# Patient Record
Sex: FEMALE | Race: WHITE | NOT HISPANIC OR LATINO
[De-identification: names, ages, dates, MRNs, and addresses within clinical notes are randomized per-mention and may not be internally consistent; named-entity substitution may affect disease eponyms.]

---

## 2003-09-25 PROBLEM — H40.1130 PRIMARY OPEN ANGLE GLAUCOMA (POAG): Noted: 2021-06-09

## 2003-09-25 PROBLEM — H26.491 POSTERIOR CAPSULAR OPACITY: Noted: 2021-06-09

## 2003-09-25 PROBLEM — H04.123 DRY EYE SYNDROME: Noted: 2021-06-09

## 2003-09-25 PROBLEM — H35.341 MACULAR HOLE: Noted: 2021-06-09

## 2003-09-25 PROBLEM — H43.393 VITREOUS FLOATERS: Noted: 2021-06-09

## 2017-01-11 ENCOUNTER — APPOINTMENT (OUTPATIENT)
Dept: CARDIOLOGY | Facility: CLINIC | Age: 82
End: 2017-01-11

## 2017-02-14 ENCOUNTER — APPOINTMENT (OUTPATIENT)
Dept: CARDIOLOGY | Facility: CLINIC | Age: 82
End: 2017-02-14

## 2017-03-22 ENCOUNTER — APPOINTMENT (OUTPATIENT)
Dept: CARDIOLOGY | Facility: CLINIC | Age: 82
End: 2017-03-22

## 2017-03-22 VITALS
HEIGHT: 63 IN | SYSTOLIC BLOOD PRESSURE: 124 MMHG | BODY MASS INDEX: 24.98 KG/M2 | DIASTOLIC BLOOD PRESSURE: 70 MMHG | WEIGHT: 141 LBS | HEART RATE: 54 BPM

## 2017-09-06 ENCOUNTER — APPOINTMENT (OUTPATIENT)
Dept: CARDIOLOGY | Facility: CLINIC | Age: 82
End: 2017-09-06

## 2017-09-06 VITALS
SYSTOLIC BLOOD PRESSURE: 120 MMHG | BODY MASS INDEX: 24.63 KG/M2 | WEIGHT: 139 LBS | HEART RATE: 68 BPM | DIASTOLIC BLOOD PRESSURE: 68 MMHG | HEIGHT: 63 IN

## 2018-01-30 ENCOUNTER — APPOINTMENT (OUTPATIENT)
Dept: CARDIOLOGY | Facility: CLINIC | Age: 83
End: 2018-01-30

## 2018-01-30 VITALS
SYSTOLIC BLOOD PRESSURE: 124 MMHG | WEIGHT: 132 LBS | DIASTOLIC BLOOD PRESSURE: 78 MMHG | HEIGHT: 63 IN | HEART RATE: 58 BPM | BODY MASS INDEX: 23.39 KG/M2

## 2018-05-29 ENCOUNTER — APPOINTMENT (OUTPATIENT)
Dept: CARDIOLOGY | Facility: CLINIC | Age: 83
End: 2018-05-29

## 2018-05-29 ENCOUNTER — NON-APPOINTMENT (OUTPATIENT)
Age: 83
End: 2018-05-29

## 2018-05-29 VITALS
BODY MASS INDEX: 23.57 KG/M2 | DIASTOLIC BLOOD PRESSURE: 70 MMHG | SYSTOLIC BLOOD PRESSURE: 126 MMHG | WEIGHT: 133 LBS | HEART RATE: 59 BPM | HEIGHT: 63 IN

## 2018-09-15 ENCOUNTER — OUTPATIENT (OUTPATIENT)
Dept: OUTPATIENT SERVICES | Facility: HOSPITAL | Age: 83
LOS: 1 days | Discharge: HOME | End: 2018-09-15

## 2018-09-15 DIAGNOSIS — E78.00 PURE HYPERCHOLESTEROLEMIA, UNSPECIFIED: ICD-10-CM

## 2018-09-15 DIAGNOSIS — N39.0 URINARY TRACT INFECTION, SITE NOT SPECIFIED: ICD-10-CM

## 2018-09-15 DIAGNOSIS — E03.9 HYPOTHYROIDISM, UNSPECIFIED: ICD-10-CM

## 2018-09-15 DIAGNOSIS — D64.9 ANEMIA, UNSPECIFIED: ICD-10-CM

## 2019-01-08 ENCOUNTER — APPOINTMENT (OUTPATIENT)
Dept: CARDIOLOGY | Facility: CLINIC | Age: 84
End: 2019-01-08

## 2019-01-08 ENCOUNTER — NON-APPOINTMENT (OUTPATIENT)
Age: 84
End: 2019-01-08

## 2019-01-08 VITALS
HEIGHT: 63 IN | BODY MASS INDEX: 20.73 KG/M2 | SYSTOLIC BLOOD PRESSURE: 140 MMHG | HEART RATE: 72 BPM | WEIGHT: 117 LBS | DIASTOLIC BLOOD PRESSURE: 76 MMHG

## 2019-01-08 NOTE — PHYSICAL EXAM
[General Appearance - Well Developed] : well developed [General Appearance - In No Acute Distress] : no acute distress [Normal Conjunctiva] : the conjunctiva exhibited no abnormalities [Eyelids - No Xanthelasma] : the eyelids demonstrated no xanthelasmas [] : no respiratory distress [Respiration, Rhythm And Depth] : normal respiratory rhythm and effort [Auscultation Breath Sounds / Voice Sounds] : lungs were clear to auscultation bilaterally [Heart Rate And Rhythm] : heart rate and rhythm were normal [Heart Sounds] : normal S1 and S2 [Edema] : no peripheral edema present [Bowel Sounds] : normal bowel sounds [Abdomen Soft] : soft [Abdomen Tenderness] : non-tender [Nail Clubbing] : no clubbing of the fingernails [Cyanosis, Localized] : no localized cyanosis [Skin Color & Pigmentation] : normal skin color and pigmentation [Oriented To Time, Place, And Person] : oriented to person, place, and time [FreeTextEntry1] : Left ankle surgery with multiple screws 10 yrs ago

## 2019-01-08 NOTE — HISTORY OF PRESENT ILLNESS
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

## 2019-03-03 ENCOUNTER — RX RENEWAL (OUTPATIENT)
Age: 84
End: 2019-03-03

## 2019-03-04 ENCOUNTER — RX RENEWAL (OUTPATIENT)
Age: 84
End: 2019-03-04

## 2019-07-09 ENCOUNTER — APPOINTMENT (OUTPATIENT)
Dept: CARDIOLOGY | Facility: CLINIC | Age: 84
End: 2019-07-09
Payer: MEDICARE

## 2019-07-09 VITALS
HEIGHT: 63 IN | DIASTOLIC BLOOD PRESSURE: 80 MMHG | HEART RATE: 55 BPM | WEIGHT: 135 LBS | SYSTOLIC BLOOD PRESSURE: 128 MMHG | BODY MASS INDEX: 23.92 KG/M2

## 2019-07-09 PROCEDURE — 99214 OFFICE O/P EST MOD 30 MIN: CPT

## 2019-07-09 PROCEDURE — 93000 ELECTROCARDIOGRAM COMPLETE: CPT

## 2019-07-09 PROCEDURE — 93306 TTE W/DOPPLER COMPLETE: CPT

## 2019-07-09 RX ORDER — DILTIAZEM HYDROCHLORIDE 120 MG/1
120 CAPSULE, EXTENDED RELEASE ORAL DAILY
Qty: 90 | Refills: 3 | Status: DISCONTINUED | COMMUNITY
Start: 2018-01-31 | End: 2019-07-09

## 2019-07-09 NOTE — HISTORY OF PRESENT ILLNESS
[FreeTextEntry1] : 89 yo F with h/o Moderate-to-severe AS (stable for >2 yrs), HTN (controlled) presents for follow up. BP log reviewed. Feels well, euvolemic, no cardiac complaints. Ambulates with cane/walker. Echo today reviewed - AS is unchanged.\par \par \par

## 2019-07-09 NOTE — PHYSICAL EXAM
[General Appearance - Well Developed] : well developed [Normal Conjunctiva] : the conjunctiva exhibited no abnormalities [General Appearance - In No Acute Distress] : no acute distress [Eyelids - No Xanthelasma] : the eyelids demonstrated no xanthelasmas [] : no respiratory distress [Respiration, Rhythm And Depth] : normal respiratory rhythm and effort [Heart Rate And Rhythm] : heart rate and rhythm were normal [Auscultation Breath Sounds / Voice Sounds] : lungs were clear to auscultation bilaterally [Heart Sounds] : normal S1 and S2 [Bowel Sounds] : normal bowel sounds [Abdomen Tenderness] : non-tender [Abdomen Soft] : soft [Cyanosis, Localized] : no localized cyanosis [Nail Clubbing] : no clubbing of the fingernails [Oriented To Time, Place, And Person] : oriented to person, place, and time [Skin Color & Pigmentation] : normal skin color and pigmentation [FreeTextEntry1] : Left ankle surgery with multiple screws 10 yrs ago

## 2019-12-01 ENCOUNTER — RX RENEWAL (OUTPATIENT)
Age: 84
End: 2019-12-01

## 2020-01-14 ENCOUNTER — APPOINTMENT (OUTPATIENT)
Dept: CARDIOLOGY | Facility: CLINIC | Age: 85
End: 2020-01-14
Payer: MEDICARE

## 2020-01-14 VITALS
HEART RATE: 51 BPM | WEIGHT: 133 LBS | HEIGHT: 63 IN | DIASTOLIC BLOOD PRESSURE: 74 MMHG | BODY MASS INDEX: 23.57 KG/M2 | SYSTOLIC BLOOD PRESSURE: 126 MMHG

## 2020-01-14 PROCEDURE — 93000 ELECTROCARDIOGRAM COMPLETE: CPT

## 2020-01-14 PROCEDURE — 99213 OFFICE O/P EST LOW 20 MIN: CPT

## 2020-01-14 NOTE — PHYSICAL EXAM
[General Appearance - Well Developed] : well developed [General Appearance - In No Acute Distress] : no acute distress [Normal Conjunctiva] : the conjunctiva exhibited no abnormalities [Eyelids - No Xanthelasma] : the eyelids demonstrated no xanthelasmas [Auscultation Breath Sounds / Voice Sounds] : lungs were clear to auscultation bilaterally [] : no respiratory distress [Respiration, Rhythm And Depth] : normal respiratory rhythm and effort [Heart Rate And Rhythm] : heart rate and rhythm were normal [Heart Sounds] : normal S1 and S2 [Abdomen Soft] : soft [Bowel Sounds] : normal bowel sounds [Abdomen Tenderness] : non-tender [Nail Clubbing] : no clubbing of the fingernails [Cyanosis, Localized] : no localized cyanosis [Skin Color & Pigmentation] : normal skin color and pigmentation [Oriented To Time, Place, And Person] : oriented to person, place, and time [FreeTextEntry1] : no JVD

## 2020-01-14 NOTE — HISTORY OF PRESENT ILLNESS
[FreeTextEntry1] : 89 yo F with h/o Mod-severe AS (stable since 2016), HTN (controlled) presents for follow up. Feels well, euvolemic, no cardiac complaints. BP log reviewed. Ambulates with a walker.\par \par EKG (1/14/20):  SB 51 bpm, LAD\par \par

## 2020-01-17 RX ORDER — DILTIAZEM HYDROCHLORIDE 120 MG/1
120 TABLET ORAL
Qty: 90 | Refills: 3 | Status: DISCONTINUED | COMMUNITY
Start: 2019-07-09 | End: 2020-01-17

## 2020-07-14 ENCOUNTER — APPOINTMENT (OUTPATIENT)
Dept: CARDIOLOGY | Facility: CLINIC | Age: 85
End: 2020-07-14
Payer: MEDICARE

## 2020-07-14 VITALS
SYSTOLIC BLOOD PRESSURE: 142 MMHG | HEART RATE: 58 BPM | TEMPERATURE: 97.6 F | WEIGHT: 133 LBS | DIASTOLIC BLOOD PRESSURE: 90 MMHG | BODY MASS INDEX: 23.57 KG/M2 | HEIGHT: 63 IN

## 2020-07-14 DIAGNOSIS — I34.0 NONRHEUMATIC MITRAL (VALVE) INSUFFICIENCY: ICD-10-CM

## 2020-07-14 PROCEDURE — 99214 OFFICE O/P EST MOD 30 MIN: CPT

## 2020-07-14 PROCEDURE — 93000 ELECTROCARDIOGRAM COMPLETE: CPT

## 2020-07-14 RX ORDER — TIMOLOL MALEATE 5 MG/ML
0.5 SOLUTION OPHTHALMIC
Refills: 0 | Status: ACTIVE | COMMUNITY
Start: 2018-04-07

## 2020-07-14 NOTE — PHYSICAL EXAM
[General Appearance - Well Developed] : well developed [General Appearance - In No Acute Distress] : no acute distress [Normal Conjunctiva] : the conjunctiva exhibited no abnormalities [Eyelids - No Xanthelasma] : the eyelids demonstrated no xanthelasmas [] : no respiratory distress [Respiration, Rhythm And Depth] : normal respiratory rhythm and effort [Auscultation Breath Sounds / Voice Sounds] : lungs were clear to auscultation bilaterally [Heart Rate And Rhythm] : heart rate and rhythm were normal [Heart Sounds] : normal S1 and S2 [Bowel Sounds] : normal bowel sounds [Abdomen Tenderness] : non-tender [Abdomen Soft] : soft [Nail Clubbing] : no clubbing of the fingernails [Cyanosis, Localized] : no localized cyanosis [Skin Color & Pigmentation] : normal skin color and pigmentation [Oriented To Time, Place, And Person] : oriented to person, place, and time [FreeTextEntry1] : Left ankle surgery with multiple screws 10 yrs ago

## 2020-07-14 NOTE — HISTORY OF PRESENT ILLNESS
[FreeTextEntry1] : 89 yo F with h/o Mod-severe AS (stable since 2016), HTN (controlled) presents for follow up.  Overall stable since last visit.  Feels well, euvolemic, no cardiac complaints.  BP log reviewed 110-125 / 65-75.  Needs refills.\par \par EKG (7/14/2020):  SB 58 bpm, LAD, no ST-T changes\par \par

## 2020-07-14 NOTE — DISCUSSION/SUMMARY
[FreeTextEntry1] : Maintain DBP >60\par Continue Cardizem, Losartan, Amlodipine\par Follow up 6 months

## 2020-11-23 ENCOUNTER — RX RENEWAL (OUTPATIENT)
Age: 85
End: 2020-11-23

## 2021-01-13 ENCOUNTER — APPOINTMENT (OUTPATIENT)
Dept: CARDIOLOGY | Facility: CLINIC | Age: 86
End: 2021-01-13
Payer: MEDICARE

## 2021-01-13 VITALS
HEART RATE: 66 BPM | BODY MASS INDEX: 23.03 KG/M2 | SYSTOLIC BLOOD PRESSURE: 126 MMHG | TEMPERATURE: 97.5 F | DIASTOLIC BLOOD PRESSURE: 80 MMHG | WEIGHT: 130 LBS

## 2021-01-13 DIAGNOSIS — G45.9 TRANSIENT CEREBRAL ISCHEMIC ATTACK, UNSPECIFIED: ICD-10-CM

## 2021-01-13 PROCEDURE — 99214 OFFICE O/P EST MOD 30 MIN: CPT

## 2021-01-13 PROCEDURE — 93000 ELECTROCARDIOGRAM COMPLETE: CPT

## 2021-01-13 NOTE — DISCUSSION/SUMMARY
[FreeTextEntry1] : Mod-severe AS\par - Echo reviewed\par - Stable since 2016\par - No CHF\par - ASA / statin\par \par HTN\par - Well controlled\par - Maintain DBP >60\par - Continue Cardizem, Losartan, Amlodipine\par \par Follow up 6 months

## 2021-01-13 NOTE — HISTORY OF PRESENT ILLNESS
[FreeTextEntry1] : 89 yo F with Mod-severe AS (stable since 2016), HTN presents for follow up.  Overall stable and doing well since last visit.  Has been under strict quarantine throughout the pandemic.  BP is well controlled.  No complaints.  BP log reviewed 110-125 / 65-75.\par \par \par EKG (1/13/21):  SR with first-degree AVB, no ST-T changes\par \par

## 2021-01-13 NOTE — PHYSICAL EXAM
[General Appearance - Well Developed] : well developed [General Appearance - In No Acute Distress] : no acute distress [Normal Conjunctiva] : the conjunctiva exhibited no abnormalities [Eyelids - No Xanthelasma] : the eyelids demonstrated no xanthelasmas [] : no respiratory distress [Respiration, Rhythm And Depth] : normal respiratory rhythm and effort [Auscultation Breath Sounds / Voice Sounds] : lungs were clear to auscultation bilaterally [Bowel Sounds] : normal bowel sounds [Abdomen Soft] : soft [Abdomen Tenderness] : non-tender [Nail Clubbing] : no clubbing of the fingernails [Cyanosis, Localized] : no localized cyanosis [Skin Color & Pigmentation] : normal skin color and pigmentation [Oriented To Time, Place, And Person] : oriented to person, place, and time [FreeTextEntry1] : Left ankle surgery with multiple screws 10 yrs ago

## 2021-06-09 ENCOUNTER — PREPPED CHART (OUTPATIENT)
Dept: URBAN - METROPOLITAN AREA CLINIC 54 | Facility: CLINIC | Age: 86
End: 2021-06-09

## 2021-06-09 PROBLEM — H40.1130 PRIMARY OPEN ANGLE GLAUCOMA (POAG): Noted: 2021-06-09

## 2021-06-09 PROBLEM — H35.3130 MODERATE RISK DRY AMD: Noted: 2021-06-09

## 2021-06-09 PROBLEM — H26.491 POSTERIOR CAPSULAR OPACITY: Noted: 2021-06-09

## 2021-06-09 PROBLEM — H43.393 VITREOUS FLOATERS: Noted: 2021-06-09

## 2021-06-09 PROBLEM — H04.123 DRY EYE SYNDROME: Noted: 2021-06-09

## 2021-06-28 ENCOUNTER — RX RENEWAL (OUTPATIENT)
Age: 86
End: 2021-06-28

## 2021-07-13 ENCOUNTER — APPOINTMENT (OUTPATIENT)
Dept: CARDIOLOGY | Facility: CLINIC | Age: 86
End: 2021-07-13
Payer: MEDICARE

## 2021-07-13 VITALS
WEIGHT: 129 LBS | HEIGHT: 63 IN | HEART RATE: 61 BPM | DIASTOLIC BLOOD PRESSURE: 80 MMHG | BODY MASS INDEX: 22.86 KG/M2 | SYSTOLIC BLOOD PRESSURE: 120 MMHG | TEMPERATURE: 97.1 F

## 2021-07-13 DIAGNOSIS — E78.2 MIXED HYPERLIPIDEMIA: ICD-10-CM

## 2021-07-13 PROCEDURE — 99214 OFFICE O/P EST MOD 30 MIN: CPT

## 2021-07-13 PROCEDURE — 93000 ELECTROCARDIOGRAM COMPLETE: CPT

## 2021-07-13 RX ORDER — MELATONIN 5 MG
5 CAPSULE ORAL
Qty: 30 | Refills: 0 | Status: DISCONTINUED | COMMUNITY
End: 2021-07-13

## 2021-07-13 NOTE — ASSESSMENT
[FreeTextEntry1] : Mod-severe AS\par - NYHA Class II\par - Stable since 2016\par - Aspirin / statin\par - No CHF\par \par Hypertension\par - BP log reviewed\par - Controlled on Cardizem, Losartan, Amlodipine\par - Maintain DBP >60\par \par Follow up 6 months

## 2021-07-13 NOTE — HISTORY OF PRESENT ILLNESS
[FreeTextEntry1] : 89 yo F with mod-severe AS (since 2016), HTN presents for follow up.  Got vaccinated.  BP is well controlled.  No complaints.  BP log reviewed 110-125 / 65-75.\par \par \par EKG (7/13/2021):  SR with first-degree AVB, no ST-T changes\par EKG (1/13/2021):  SR with first-degree AVB, no ST-T changes\par

## 2021-07-13 NOTE — PHYSICAL EXAM
[Well Developed] : well developed [Well Nourished] : well nourished [No Acute Distress] : no acute distress [Normal Conjunctiva] : normal conjunctiva [Normal Venous Pressure] : normal venous pressure [No Carotid Bruit] : no carotid bruit [Clear Lung Fields] : clear lung fields [Good Air Entry] : good air entry [No Respiratory Distress] : no respiratory distress  [Soft] : abdomen soft [Non Tender] : non-tender [No Masses/organomegaly] : no masses/organomegaly [Normal Bowel Sounds] : normal bowel sounds [Normal Gait] : normal gait [No Edema] : no edema [No Cyanosis] : no cyanosis [No Clubbing] : no clubbing [No Varicosities] : no varicosities [No Rash] : no rash [No Skin Lesions] : no skin lesions [Moves all extremities] : moves all extremities [No Focal Deficits] : no focal deficits [Normal Speech] : normal speech [Alert and Oriented] : alert and oriented [Normal memory] : normal memory [de-identified] : RRR, JOSESITO, mild ankle edema (chronic)

## 2021-09-21 ENCOUNTER — RX RENEWAL (OUTPATIENT)
Age: 86
End: 2021-09-21

## 2021-11-16 ENCOUNTER — RX RENEWAL (OUTPATIENT)
Age: 86
End: 2021-11-16

## 2021-12-08 ENCOUNTER — 6 MONTH FOLLOW UP (OUTPATIENT)
Dept: URBAN - METROPOLITAN AREA CLINIC 54 | Facility: CLINIC | Age: 86
End: 2021-12-08

## 2021-12-08 DIAGNOSIS — H43.393: ICD-10-CM

## 2021-12-08 DIAGNOSIS — H26.491: ICD-10-CM

## 2021-12-08 DIAGNOSIS — H35.3130: ICD-10-CM

## 2021-12-08 DIAGNOSIS — H40.1130: ICD-10-CM

## 2021-12-08 DIAGNOSIS — H04.123: ICD-10-CM

## 2021-12-08 PROCEDURE — 99214 OFFICE O/P EST MOD 30 MIN: CPT

## 2021-12-08 ASSESSMENT — VISUAL ACUITY
OS_CC: 20/200
OD_CC: CF 4FT

## 2021-12-08 ASSESSMENT — TONOMETRY
OS_IOP_MMHG: 20
OD_IOP_MMHG: 20

## 2022-01-11 ENCOUNTER — APPOINTMENT (OUTPATIENT)
Dept: CARDIOLOGY | Facility: CLINIC | Age: 87
End: 2022-01-11
Payer: MEDICARE

## 2022-01-11 VITALS
TEMPERATURE: 97.5 F | HEIGHT: 63 IN | HEART RATE: 58 BPM | WEIGHT: 132 LBS | BODY MASS INDEX: 23.39 KG/M2 | SYSTOLIC BLOOD PRESSURE: 120 MMHG | DIASTOLIC BLOOD PRESSURE: 70 MMHG

## 2022-01-11 PROCEDURE — 99214 OFFICE O/P EST MOD 30 MIN: CPT

## 2022-01-11 PROCEDURE — 93000 ELECTROCARDIOGRAM COMPLETE: CPT

## 2022-01-11 NOTE — HISTORY OF PRESENT ILLNESS
[FreeTextEntry1] : 89 yo F with mod-severe AS (since 2016), HTN presents for follow up.  Got vaccinated.  BP is well controlled.  Persistent nonproductive cough for years possibly related to dysphagia after remote TIA according to her daughter.  No fever or chills.  Taking Mucinex.\par \par \par EKG (1/11/2022):  SR with first-degree AVB, no ST-T changes\par EKG (7/13/2021):  SR with first-degree AVB, no ST-T changes\par EKG (1/13/2021):  SR with first-degree AVB, no ST-T changes\par

## 2022-01-11 NOTE — ASSESSMENT
[FreeTextEntry1] : Mod-severe AS\par - Stable since 2016\par - Aspirin / statin\par - No CHF\par \par Hypertension\par - Controlled on Cardizem, Losartan, Amlodipine\par - Maintain DBP >60\par \par Follow up 6 months

## 2022-01-11 NOTE — PHYSICAL EXAM
Problem: VTE, Risk for  Goal: # Absence of symptoms of venous thromboembolism  Outcome: Outcome Met, Continue evaluating goal progress toward completion  No s/s of VTE, continue to monitor.    Problem: At Risk for Falls  Goal: # Patient does not fall  Outcome: Outcome Met, Continue evaluating goal progress toward completion  Zone 2 bed alarm implemented, shifted to focus of mini-team, fall prevention activities implemented and cares clustered with minimized stimuli when possible. Continue to monitor.       [Well Developed] : well developed [Well Nourished] : well nourished [No Acute Distress] : no acute distress [Normal Conjunctiva] : normal conjunctiva [Normal Venous Pressure] : normal venous pressure [No Carotid Bruit] : no carotid bruit [Clear Lung Fields] : clear lung fields [Good Air Entry] : good air entry [No Respiratory Distress] : no respiratory distress  [Soft] : abdomen soft [Non Tender] : non-tender [No Masses/organomegaly] : no masses/organomegaly [Normal Bowel Sounds] : normal bowel sounds [Normal Gait] : normal gait [No Edema] : no edema [No Cyanosis] : no cyanosis [No Clubbing] : no clubbing [No Varicosities] : no varicosities [No Rash] : no rash [No Skin Lesions] : no skin lesions [Moves all extremities] : moves all extremities [No Focal Deficits] : no focal deficits [Normal Speech] : normal speech [Alert and Oriented] : alert and oriented [Normal memory] : normal memory [de-identified] : RRR, 3/6 JOSESITO, mild ankle edema (chronic)

## 2022-03-14 ENCOUNTER — RX RENEWAL (OUTPATIENT)
Age: 87
End: 2022-03-14

## 2022-06-13 ENCOUNTER — RX RENEWAL (OUTPATIENT)
Age: 87
End: 2022-06-13

## 2022-07-12 ENCOUNTER — APPOINTMENT (OUTPATIENT)
Dept: CARDIOLOGY | Facility: CLINIC | Age: 87
End: 2022-07-12

## 2022-07-12 VITALS
HEART RATE: 64 BPM | HEIGHT: 63 IN | BODY MASS INDEX: 21.79 KG/M2 | WEIGHT: 123 LBS | DIASTOLIC BLOOD PRESSURE: 60 MMHG | SYSTOLIC BLOOD PRESSURE: 120 MMHG

## 2022-07-12 DIAGNOSIS — K21.9 GASTRO-ESOPHAGEAL REFLUX DISEASE W/OUT ESOPHAGITIS: ICD-10-CM

## 2022-07-12 DIAGNOSIS — Z78.9 OTHER SPECIFIED HEALTH STATUS: ICD-10-CM

## 2022-07-12 PROCEDURE — 93000 ELECTROCARDIOGRAM COMPLETE: CPT

## 2022-07-12 PROCEDURE — 99213 OFFICE O/P EST LOW 20 MIN: CPT

## 2022-07-12 RX ORDER — DILTIAZEM HYDROCHLORIDE 120 MG/1
120 CAPSULE, EXTENDED RELEASE ORAL DAILY
Qty: 90 | Refills: 3 | Status: ACTIVE | COMMUNITY
Start: 2020-01-17 | End: 1900-01-01

## 2022-07-13 NOTE — PHYSICAL EXAM
[Well Developed] : well developed [No Acute Distress] : no acute distress [Normal Conjunctiva] : normal conjunctiva [No Carotid Bruit] : no carotid bruit [Clear Lung Fields] : clear lung fields [Good Air Entry] : good air entry [No Respiratory Distress] : no respiratory distress  [Soft] : abdomen soft [Non Tender] : non-tender [Normal Gait] : normal gait [No Edema] : no edema [No Cyanosis] : no cyanosis [No Clubbing] : no clubbing [No Rash] : no rash [No Skin Lesions] : no skin lesions [Moves all extremities] : moves all extremities [No Focal Deficits] : no focal deficits [Alert and Oriented] : alert and oriented [de-identified] : RRR, 3/6 JOSESITO, mild ankle edema (chronic)

## 2022-07-13 NOTE — ASSESSMENT
[FreeTextEntry1] : 89 F with mod-severe AS and hypertension.  Stable and clinically unchanged since 2016.\par \par \par Continue aspirin and statin\par Continue Cardizem, Losartan, Amlodipine\par Maintain DBP >60\par \par Follow up 6 months

## 2022-07-13 NOTE — HISTORY OF PRESENT ILLNESS
[FreeTextEntry1] : 89 yo F with mod-severe AS (since 2016) and hypertension.  Persistent nonproductive cough for years possibly related to dysphagia after remote TIA according to her daughter.\par \par \par EKG (7/12/2022):  SR with first-degree AVB, no ST-T changes\par

## 2022-11-15 ENCOUNTER — RX RENEWAL (OUTPATIENT)
Age: 87
End: 2022-11-15

## 2022-12-13 ENCOUNTER — RX RENEWAL (OUTPATIENT)
Age: 87
End: 2022-12-13

## 2022-12-16 ENCOUNTER — RX RENEWAL (OUTPATIENT)
Age: 87
End: 2022-12-16

## 2022-12-16 RX ORDER — SPIRONOLACTONE 25 MG/1
25 TABLET ORAL
Qty: 30 | Refills: 3 | Status: ACTIVE | COMMUNITY
Start: 2021-09-21 | End: 1900-01-01

## 2023-01-10 ENCOUNTER — APPOINTMENT (OUTPATIENT)
Dept: CARDIOLOGY | Facility: CLINIC | Age: 88
End: 2023-01-10
Payer: MEDICARE

## 2023-01-10 VITALS
DIASTOLIC BLOOD PRESSURE: 78 MMHG | SYSTOLIC BLOOD PRESSURE: 122 MMHG | HEART RATE: 65 BPM | BODY MASS INDEX: 20.55 KG/M2 | HEIGHT: 63 IN | WEIGHT: 116 LBS

## 2023-01-10 DIAGNOSIS — I10 ESSENTIAL (PRIMARY) HYPERTENSION: ICD-10-CM

## 2023-01-10 DIAGNOSIS — I35.0 NONRHEUMATIC AORTIC (VALVE) STENOSIS: ICD-10-CM

## 2023-01-10 PROCEDURE — 99213 OFFICE O/P EST LOW 20 MIN: CPT

## 2023-01-10 PROCEDURE — 93000 ELECTROCARDIOGRAM COMPLETE: CPT

## 2023-01-10 NOTE — ASSESSMENT
[FreeTextEntry1] : 89 F with mod-severe AS and hypertension.  Stable and clinically unchanged since 2016.\par \par \par Continue aspirin and statin\par Continue Cardizem, Losartan, Amlodipine, Spironolactone\par Maintain DBP >60\par Follow up 6 months

## 2023-01-10 NOTE — HISTORY OF PRESENT ILLNESS
[FreeTextEntry1] : 89 yo F with mod-severe AS since 2016 and hypertension.  Persistent nonproductive cough for years possibly related to dysphagia after remote TIA according to her daughter.  BP log reviewed.  Stable and clinically unchanged.  Few minor falls in the house.  Turning 89 in 6 months.\par \par Her daughter owns [employer] and also a \par \par \par EKG (1/10/2023):  SR with first-degree AVB, no ST-T changes\par

## 2023-01-10 NOTE — PHYSICAL EXAM
[Well Developed] : well developed [No Acute Distress] : no acute distress [Normal Conjunctiva] : normal conjunctiva [No Carotid Bruit] : no carotid bruit [Clear Lung Fields] : clear lung fields [Good Air Entry] : good air entry [No Respiratory Distress] : no respiratory distress  [Soft] : abdomen soft [Non Tender] : non-tender [Normal Gait] : normal gait [No Edema] : no edema [No Cyanosis] : no cyanosis [No Clubbing] : no clubbing [No Rash] : no rash [No Skin Lesions] : no skin lesions [Moves all extremities] : moves all extremities [No Focal Deficits] : no focal deficits [Alert and Oriented] : alert and oriented [de-identified] : RRR, 3/6 JOSESITO, mild ankle edema (chronic)

## 2023-05-07 ENCOUNTER — RX RENEWAL (OUTPATIENT)
Age: 88
End: 2023-05-07

## 2023-05-12 ENCOUNTER — RX RENEWAL (OUTPATIENT)
Age: 88
End: 2023-05-12

## 2023-05-12 RX ORDER — DILTIAZEM HYDROCHLORIDE 120 MG/1
120 CAPSULE, EXTENDED RELEASE ORAL DAILY
Qty: 90 | Refills: 3 | Status: ACTIVE | COMMUNITY
Start: 2023-05-07 | End: 1900-01-01

## 2023-07-12 ENCOUNTER — INPATIENT (INPATIENT)
Facility: HOSPITAL | Age: 88
LOS: 4 days | Discharge: NURSING, REHAB OR LONG-TERM CARE | DRG: 689 | End: 2023-07-17
Attending: INTERNAL MEDICINE | Admitting: HOSPITALIST
Payer: MEDICARE

## 2023-07-12 VITALS
SYSTOLIC BLOOD PRESSURE: 121 MMHG | DIASTOLIC BLOOD PRESSURE: 58 MMHG | RESPIRATION RATE: 17 BRPM | WEIGHT: 99.65 LBS | TEMPERATURE: 98 F | OXYGEN SATURATION: 97 % | HEART RATE: 55 BPM

## 2023-07-12 DIAGNOSIS — N39.0 URINARY TRACT INFECTION, SITE NOT SPECIFIED: ICD-10-CM

## 2023-07-12 LAB
ALBUMIN SERPL ELPH-MCNC: 3.3 G/DL — LOW (ref 3.5–5.2)
ALP SERPL-CCNC: 63 U/L — SIGNIFICANT CHANGE UP (ref 30–115)
ALT FLD-CCNC: 16 U/L — SIGNIFICANT CHANGE UP (ref 0–41)
ANION GAP SERPL CALC-SCNC: 9 MMOL/L — SIGNIFICANT CHANGE UP (ref 7–14)
APPEARANCE UR: ABNORMAL
AST SERPL-CCNC: 22 U/L — SIGNIFICANT CHANGE UP (ref 0–41)
BASOPHILS # BLD AUTO: 0.03 K/UL — SIGNIFICANT CHANGE UP (ref 0–0.2)
BASOPHILS NFR BLD AUTO: 0.5 % — SIGNIFICANT CHANGE UP (ref 0–1)
BILIRUB SERPL-MCNC: 0.3 MG/DL — SIGNIFICANT CHANGE UP (ref 0.2–1.2)
BILIRUB UR-MCNC: NEGATIVE — SIGNIFICANT CHANGE UP
BUN SERPL-MCNC: 31 MG/DL — HIGH (ref 10–20)
CALCIUM SERPL-MCNC: 9.1 MG/DL — SIGNIFICANT CHANGE UP (ref 8.4–10.5)
CHLORIDE SERPL-SCNC: 110 MMOL/L — SIGNIFICANT CHANGE UP (ref 98–110)
CO2 SERPL-SCNC: 30 MMOL/L — SIGNIFICANT CHANGE UP (ref 17–32)
COLOR SPEC: YELLOW — SIGNIFICANT CHANGE UP
CREAT SERPL-MCNC: 0.9 MG/DL — SIGNIFICANT CHANGE UP (ref 0.7–1.5)
DIFF PNL FLD: NEGATIVE — SIGNIFICANT CHANGE UP
EGFR: 57 ML/MIN/1.73M2 — LOW
EOSINOPHIL # BLD AUTO: 0.11 K/UL — SIGNIFICANT CHANGE UP (ref 0–0.7)
EOSINOPHIL NFR BLD AUTO: 1.9 % — SIGNIFICANT CHANGE UP (ref 0–8)
GLUCOSE SERPL-MCNC: 164 MG/DL — HIGH (ref 70–99)
GLUCOSE UR QL: NEGATIVE — SIGNIFICANT CHANGE UP
HCT VFR BLD CALC: 37.7 % — SIGNIFICANT CHANGE UP (ref 37–47)
HGB BLD-MCNC: 11.9 G/DL — LOW (ref 12–16)
IMM GRANULOCYTES NFR BLD AUTO: 0.4 % — HIGH (ref 0.1–0.3)
KETONES UR-MCNC: NEGATIVE — SIGNIFICANT CHANGE UP
LACTATE SERPL-SCNC: 1.8 MMOL/L — SIGNIFICANT CHANGE UP (ref 0.7–2)
LEUKOCYTE ESTERASE UR-ACNC: ABNORMAL
LIDOCAIN IGE QN: 23 U/L — SIGNIFICANT CHANGE UP (ref 7–60)
LYMPHOCYTES # BLD AUTO: 1.03 K/UL — LOW (ref 1.2–3.4)
LYMPHOCYTES # BLD AUTO: 18 % — LOW (ref 20.5–51.1)
MAGNESIUM SERPL-MCNC: 2 MG/DL — SIGNIFICANT CHANGE UP (ref 1.8–2.4)
MCHC RBC-ENTMCNC: 31.6 G/DL — LOW (ref 32–37)
MCHC RBC-ENTMCNC: 32.2 PG — HIGH (ref 27–31)
MCV RBC AUTO: 102.2 FL — HIGH (ref 81–99)
MONOCYTES # BLD AUTO: 0.62 K/UL — HIGH (ref 0.1–0.6)
MONOCYTES NFR BLD AUTO: 10.9 % — HIGH (ref 1.7–9.3)
NEUTROPHILS # BLD AUTO: 3.9 K/UL — SIGNIFICANT CHANGE UP (ref 1.4–6.5)
NEUTROPHILS NFR BLD AUTO: 68.3 % — SIGNIFICANT CHANGE UP (ref 42.2–75.2)
NITRITE UR-MCNC: POSITIVE
NRBC # BLD: 0 /100 WBCS — SIGNIFICANT CHANGE UP (ref 0–0)
PH UR: 7.5 — SIGNIFICANT CHANGE UP (ref 5–8)
PLATELET # BLD AUTO: 140 K/UL — SIGNIFICANT CHANGE UP (ref 130–400)
PMV BLD: 11.6 FL — HIGH (ref 7.4–10.4)
POTASSIUM SERPL-MCNC: 3.7 MMOL/L — SIGNIFICANT CHANGE UP (ref 3.5–5)
POTASSIUM SERPL-SCNC: 3.7 MMOL/L — SIGNIFICANT CHANGE UP (ref 3.5–5)
PROT SERPL-MCNC: 5.3 G/DL — LOW (ref 6–8)
PROT UR-MCNC: SIGNIFICANT CHANGE UP
RBC # BLD: 3.69 M/UL — LOW (ref 4.2–5.4)
RBC # FLD: 14.1 % — SIGNIFICANT CHANGE UP (ref 11.5–14.5)
SODIUM SERPL-SCNC: 149 MMOL/L — HIGH (ref 135–146)
SP GR SPEC: 1.02 — SIGNIFICANT CHANGE UP (ref 1.01–1.03)
TROPONIN T SERPL-MCNC: <0.01 NG/ML — SIGNIFICANT CHANGE UP
TROPONIN T SERPL-MCNC: <0.01 NG/ML — SIGNIFICANT CHANGE UP
UROBILINOGEN FLD QL: SIGNIFICANT CHANGE UP
WBC # BLD: 5.71 K/UL — SIGNIFICANT CHANGE UP (ref 4.8–10.8)
WBC # FLD AUTO: 5.71 K/UL — SIGNIFICANT CHANGE UP (ref 4.8–10.8)

## 2023-07-12 PROCEDURE — 99285 EMERGENCY DEPT VISIT HI MDM: CPT | Mod: FS

## 2023-07-12 PROCEDURE — 97162 PT EVAL MOD COMPLEX 30 MIN: CPT | Mod: GP

## 2023-07-12 PROCEDURE — 99223 1ST HOSP IP/OBS HIGH 75: CPT

## 2023-07-12 PROCEDURE — 83735 ASSAY OF MAGNESIUM: CPT

## 2023-07-12 PROCEDURE — 83540 ASSAY OF IRON: CPT

## 2023-07-12 PROCEDURE — 95819 EEG AWAKE AND ASLEEP: CPT

## 2023-07-12 PROCEDURE — 84466 ASSAY OF TRANSFERRIN: CPT

## 2023-07-12 PROCEDURE — 83550 IRON BINDING TEST: CPT

## 2023-07-12 PROCEDURE — 84484 ASSAY OF TROPONIN QUANT: CPT

## 2023-07-12 PROCEDURE — 84443 ASSAY THYROID STIM HORMONE: CPT

## 2023-07-12 PROCEDURE — 80048 BASIC METABOLIC PNL TOTAL CA: CPT

## 2023-07-12 PROCEDURE — 84439 ASSAY OF FREE THYROXINE: CPT

## 2023-07-12 PROCEDURE — 82607 VITAMIN B-12: CPT

## 2023-07-12 PROCEDURE — 82728 ASSAY OF FERRITIN: CPT

## 2023-07-12 PROCEDURE — 82746 ASSAY OF FOLIC ACID SERUM: CPT

## 2023-07-12 PROCEDURE — 93010 ELECTROCARDIOGRAM REPORT: CPT

## 2023-07-12 PROCEDURE — 83036 HEMOGLOBIN GLYCOSYLATED A1C: CPT

## 2023-07-12 PROCEDURE — 97110 THERAPEUTIC EXERCISES: CPT | Mod: GP

## 2023-07-12 PROCEDURE — 71045 X-RAY EXAM CHEST 1 VIEW: CPT | Mod: 26

## 2023-07-12 PROCEDURE — 85027 COMPLETE CBC AUTOMATED: CPT

## 2023-07-12 PROCEDURE — 70450 CT HEAD/BRAIN W/O DYE: CPT | Mod: 26,MA

## 2023-07-12 PROCEDURE — 80053 COMPREHEN METABOLIC PANEL: CPT

## 2023-07-12 PROCEDURE — 36415 COLL VENOUS BLD VENIPUNCTURE: CPT

## 2023-07-12 PROCEDURE — 85025 COMPLETE CBC W/AUTO DIFF WBC: CPT

## 2023-07-12 RX ORDER — LATANOPROST 0.05 MG/ML
1 SOLUTION/ DROPS OPHTHALMIC; TOPICAL AT BEDTIME
Refills: 0 | Status: DISCONTINUED | OUTPATIENT
Start: 2023-07-12 | End: 2023-07-17

## 2023-07-12 RX ORDER — VITAMIN E 100 UNIT
100 CAPSULE ORAL
Refills: 0 | Status: DISCONTINUED | OUTPATIENT
Start: 2023-07-12 | End: 2023-07-14

## 2023-07-12 RX ORDER — CHOLECALCIFEROL (VITAMIN D3) 125 MCG
2000 CAPSULE ORAL DAILY
Refills: 0 | Status: DISCONTINUED | OUTPATIENT
Start: 2023-07-12 | End: 2023-07-17

## 2023-07-12 RX ORDER — SODIUM CHLORIDE 9 MG/ML
1000 INJECTION INTRAMUSCULAR; INTRAVENOUS; SUBCUTANEOUS ONCE
Refills: 0 | Status: COMPLETED | OUTPATIENT
Start: 2023-07-12 | End: 2023-07-12

## 2023-07-12 RX ORDER — TIMOLOL 0.5 %
1 DROPS OPHTHALMIC (EYE)
Refills: 0 | DISCHARGE

## 2023-07-12 RX ORDER — LANOLIN ALCOHOL/MO/W.PET/CERES
5 CREAM (GRAM) TOPICAL AT BEDTIME
Refills: 0 | Status: DISCONTINUED | OUTPATIENT
Start: 2023-07-12 | End: 2023-07-15

## 2023-07-12 RX ORDER — LEVOTHYROXINE SODIUM 125 MCG
100 TABLET ORAL DAILY
Refills: 0 | Status: DISCONTINUED | OUTPATIENT
Start: 2023-07-12 | End: 2023-07-15

## 2023-07-12 RX ORDER — FAMOTIDINE 10 MG/ML
1 INJECTION INTRAVENOUS
Refills: 0 | DISCHARGE

## 2023-07-12 RX ORDER — VITAMIN E 100 UNIT
1 CAPSULE ORAL
Refills: 0 | DISCHARGE

## 2023-07-12 RX ORDER — LOSARTAN POTASSIUM 100 MG/1
100 TABLET, FILM COATED ORAL DAILY
Refills: 0 | Status: DISCONTINUED | OUTPATIENT
Start: 2023-07-12 | End: 2023-07-14

## 2023-07-12 RX ORDER — LEVOTHYROXINE SODIUM 125 MCG
1 TABLET ORAL
Refills: 0 | DISCHARGE

## 2023-07-12 RX ORDER — LATANOPROST 0.05 MG/ML
1 SOLUTION/ DROPS OPHTHALMIC; TOPICAL
Refills: 0 | DISCHARGE

## 2023-07-12 RX ORDER — ASCORBIC ACID 60 MG
1 TABLET,CHEWABLE ORAL
Refills: 0 | DISCHARGE

## 2023-07-12 RX ORDER — MULTIVIT-MIN/FERROUS GLUCONATE 9 MG/15 ML
1 LIQUID (ML) ORAL DAILY
Refills: 0 | Status: DISCONTINUED | OUTPATIENT
Start: 2023-07-12 | End: 2023-07-17

## 2023-07-12 RX ORDER — ASCORBIC ACID 60 MG
250 TABLET,CHEWABLE ORAL DAILY
Refills: 0 | Status: DISCONTINUED | OUTPATIENT
Start: 2023-07-12 | End: 2023-07-14

## 2023-07-12 RX ORDER — ATORVASTATIN CALCIUM 80 MG/1
1 TABLET, FILM COATED ORAL
Refills: 0 | DISCHARGE

## 2023-07-12 RX ORDER — SPIRONOLACTONE 25 MG/1
25 TABLET, FILM COATED ORAL
Refills: 0 | Status: DISCONTINUED | OUTPATIENT
Start: 2023-07-12 | End: 2023-07-17

## 2023-07-12 RX ORDER — ATORVASTATIN CALCIUM 80 MG/1
40 TABLET, FILM COATED ORAL AT BEDTIME
Refills: 0 | Status: DISCONTINUED | OUTPATIENT
Start: 2023-07-12 | End: 2023-07-17

## 2023-07-12 RX ORDER — CEFTRIAXONE 500 MG/1
1000 INJECTION, POWDER, FOR SOLUTION INTRAMUSCULAR; INTRAVENOUS ONCE
Refills: 0 | Status: COMPLETED | OUTPATIENT
Start: 2023-07-12 | End: 2023-07-12

## 2023-07-12 RX ORDER — MULTIVIT-MIN/FERROUS GLUCONATE 9 MG/15 ML
1 LIQUID (ML) ORAL
Refills: 0 | DISCHARGE

## 2023-07-12 RX ORDER — ENOXAPARIN SODIUM 100 MG/ML
40 INJECTION SUBCUTANEOUS EVERY 24 HOURS
Refills: 0 | Status: DISCONTINUED | OUTPATIENT
Start: 2023-07-12 | End: 2023-07-17

## 2023-07-12 RX ORDER — TIMOLOL 0.5 %
1 DROPS OPHTHALMIC (EYE) DAILY
Refills: 0 | Status: DISCONTINUED | OUTPATIENT
Start: 2023-07-12 | End: 2023-07-17

## 2023-07-12 RX ORDER — CEFTRIAXONE 500 MG/1
1000 INJECTION, POWDER, FOR SOLUTION INTRAMUSCULAR; INTRAVENOUS EVERY 24 HOURS
Refills: 0 | Status: COMPLETED | OUTPATIENT
Start: 2023-07-12 | End: 2023-07-15

## 2023-07-12 RX ORDER — CHOLECALCIFEROL (VITAMIN D3) 125 MCG
1 CAPSULE ORAL
Refills: 0 | DISCHARGE

## 2023-07-12 RX ADMIN — ATORVASTATIN CALCIUM 40 MILLIGRAM(S): 80 TABLET, FILM COATED ORAL at 21:42

## 2023-07-12 RX ADMIN — SODIUM CHLORIDE 1000 MILLILITER(S): 9 INJECTION INTRAMUSCULAR; INTRAVENOUS; SUBCUTANEOUS at 11:49

## 2023-07-12 RX ADMIN — LATANOPROST 1 DROP(S): 0.05 SOLUTION/ DROPS OPHTHALMIC; TOPICAL at 21:42

## 2023-07-12 RX ADMIN — Medication 5 MILLIGRAM(S): at 21:42

## 2023-07-12 RX ADMIN — CEFTRIAXONE 100 MILLIGRAM(S): 500 INJECTION, POWDER, FOR SOLUTION INTRAMUSCULAR; INTRAVENOUS at 13:45

## 2023-07-12 NOTE — H&P ADULT - NSHPLABSRESULTS_GEN_ALL_CORE
LABS:  cret                        11.9   5.71  )-----------( 140      ( 12 Jul 2023 11:35 )             37.7     07-12    149<H>  |  110  |  31<H>  ----------------------------<  164<H>  3.7   |  30  |  0.9    Ca    9.1      12 Jul 2023 11:35  Mg     2.0     07-12    TPro  5.3<L>  /  Alb  3.3<L>  /  TBili  0.3  /  DBili  x   /  AST  22  /  ALT  16  /  AlkPhos  63  07-12      < from: CT Head No Cont (07.12.23 @ 13:38) >    IMPRESSION:    1.  No evidence of acute intracranial pathology.    2.  Severe chronic microvascular changes and chronic lacunar infarcts   within bilateral thalami.    3.  Left mastoid and middle ear opacification as well as partial right   mastoid opacification. Correlate clinically.    --- End of Report ---    < end of copied text >    < from: Xray Chest 1 View- PORTABLE-Urgent (07.12.23 @ 11:53) >    Impression:    No radiographic evidence of acute cardiopulmonary disease.      < end of copied text >    pH Urine: 7.5  Glucose Qualitative, Urine: Negative  Blood, Urine: Negative  Color: Yellow  Urine Appearance: Slightly Turbid  Bilirubin: Negative  Ketone - Urine: Negative  Specific Gravity: 1.016  Protein, Urine: Trace  Urobilinogen: <2 mg/dL  Nitrite: Positive  Leukocyte Esterase Concentration: Large

## 2023-07-12 NOTE — ED PROVIDER NOTE - PHYSICAL EXAMINATION
Physical Exam    Vital Signs: I have reviewed the initial vital signs.  Constitutional: well-nourished, appears stated age, no acute distress  Eyes: Conjunctiva pink, Sclera clear, PERRLA, EOMI, no ptosis, no entrapment, no racoon eyes.  Ears: No hemotypanum, TM intact, with cone of light visualiuzed.  Cardiovascular: S1 and S2, regular rate, regular rhythm, well-perfused extremities, radial pulses equal and 2+, calves nonttp, equal in size  Respiratory: unlabored respiratory effort, speaking in full sentences, handling oral secretions, clear to auscultation bilaterally no wheezing, rales and rhonchi  Gastrointestinal: soft, non-tender abdomen, no pulsatile mass, normal bowl sounds  Musculoskeletal: supple neck, no lower extremity edema, no midline tenderness, paraspinal tenderness, clavicular creptius, painful rom, moving all extreities appropriately, no gross bony deformities or swelling.  Integumentary: warm, dry, no rashes, lacerations,  Neurologic: awake, alert x 1,n o facial droop slurred speech tremors, or motor weakness

## 2023-07-12 NOTE — H&P ADULT - ASSESSMENT
89yo F hx of HTN, hypothyroidism, glaucoma, GERD, HTN, HLD presenting to the hospital for 1 week of weakness. Found to have acute cystitis.    #Weakness/Lethargy  #Unwitnessed falls  #Acute cystitis  - fall precaution  - f/u UCx  - cont ceftriaxone  - PT/OT  - CT-Head (-)  - tele monitoring for 24 hours  - EKG NS with 1st degree AVB  - EEG, TTE  - check orthostatics    #Macrocytic anemia  - cont B12, MTV  - check folic acid, B12 levels    #Hypothyroidism  - check TSH  - cont synthroid    #HTN  - per patient, Malpeso instructed patient to hold BP meds due to a fall. Diltiazem and amlodipine were held. Still on losartan and spironolactone  - currently BP elevated. restart losartan, spironolactone  - holding AVB for now given bradycardia.     #HLD  - cont statin    #Glaucoma  - cont eye drops    #DVT ppx: lovenox  #GI ppx: protonix  #Diet: DASH/TLC  #Activity: AAT  #Code: full  #Dispo: acute. follow up UCx. cont abx

## 2023-07-12 NOTE — H&P ADULT - ATTENDING COMMENTS
89yo F hx of HTN, hypothyroidism, Falls, glaucoma, GERD, HTN, HLD presenting to the hospital for 1 week of weakness. Found to have acute cystitis.    Labs significant for macrocytic anemia, hypernatremia (Na- 149(, elevated BUN. Lactate and lipase, trops (-). UA (+) for nitrites, LE and bacteria. CT-head (-) for acute pathology.     Admitted for acute cystitis and weakness. ceftriaxone 1g x1 given. 1L NS bolus given.     CT: No evidence of acute intracranial pathology. Severe chronic microvascular changes and chronic lacunar infarcts within bilateral thalami.   Left mastoid and middle ear opacification as well as partial right   mastoid opacification. Correlate clinically.      Agree  with assessment  except for changes below.     Vital Signs (24 Hrs):  T(C): 36 (07-12-23 @ 15:44), Max: 36.4 (07-12-23 @ 10:31)  HR: 55 (07-12-23 @ 15:44) (55 - 55)  BP: 151/82 (07-12-23 @ 15:44) (121/58 - 151/82)  RR: 18 (07-12-23 @ 15:44) (17 - 18)  SpO2: 98% (07-12-23 @ 15:44) (97% - 98%)      IMPRESSION  Acute Cystitis   Associated with Weakness  and Lethargy   Hx Multiple  Unwitnessed Falls Cardiologist Stopped  Antihypertensives  Appears Mildly Dehydrated s/p 1 L of Fluids   F/u cardiology   Fall Up Cultures   Continue  Anti Biotics for now   PT/Rehab   CT Head as  Above    tele monitoring for 24 hours  ECG  Noted  f/u ECHO and possible EEG  Check Orthostatics   Fall Precaution   GOC  Discussion in the  AM      Macrocytic  Anemia   cont B12, MTV  check folic acid, B12 levels    Hx Hypothyroidism - c/w Synthroid     Hx HTN - c/w home medications     Hx HLD - Statin     Hx Glaucoma - c/w eye  drops 89yo F hx of HTN, hypothyroidism, Falls, glaucoma, GERD, HTN, HLD presenting to the hospital for 1 week of weakness. Found to have acute cystitis.    Labs significant for macrocytic anemia, hypernatremia (Na- 149(, elevated BUN. Lactate and lipase, trops (-). UA (+) for nitrites, LE and bacteria. CT-head (-) for acute pathology.     Admitted for acute cystitis and weakness. ceftriaxone 1g x1 given. 1L NS bolus given.     CT: No evidence of acute intracranial pathology. Severe chronic microvascular changes and chronic lacunar infarcts within bilateral thalami.   Left mastoid and middle ear opacification as well as partial right   mastoid opacification. Correlate clinically.      Agree  with assessment  except for changes below.     Vital Signs (24 Hrs):  T(C): 36 (07-12-23 @ 15:44), Max: 36.4 (07-12-23 @ 10:31)  HR: 55 (07-12-23 @ 15:44) (55 - 55)  BP: 151/82 (07-12-23 @ 15:44) (121/58 - 151/82)  RR: 18 (07-12-23 @ 15:44) (17 - 18)  SpO2: 98% (07-12-23 @ 15:44) (97% - 98%)      IMPRESSION  Acute Cystitis   Associated with Weakness  and Lethargy   Hx Multiple  Unwitnessed Falls Cardiologist Stopped  Antihypertensives  Appears Mildly Dehydrated s/p 1 L of Fluids   F/u cardiology   Fall Up Cultures   Continue  Anti Biotics for now   PT/Rehab   CT Head as  Above    tele monitoring for 24 hours  ECG  Noted  f/u ECHO and possible EEG  Check Orthostatics   Fall Precaution   GOC  Discussion in the  AM      Macrocytic  Anemia   cont B12, MTV  check folic acid, B12 levels    CKD 2-3  Monitor Cr/BUN Electrolytes including Phosphorus  Avoid Nephro toxins      Hx Hypothyroidism - c/w Synthroid     Hx HTN - c/w home medications     Hx HLD - Statin     Hx Glaucoma - c/w eye  drops 89yo F hx of HTN, hypothyroidism, Falls, glaucoma, GERD, HTN, HLD presenting to the hospital for 1 week of weakness. Found to have acute cystitis.    Labs significant for macrocytic anemia, hypernatremia (Na- 149(, elevated BUN. Lactate and lipase, trops (-). UA (+) for nitrites, LE and bacteria. CT-head (-) for acute pathology.     Admitted for acute cystitis and weakness. ceftriaxone 1g x1 given. 1L NS bolus given.     CT: No evidence of acute intracranial pathology. Severe chronic microvascular changes and chronic lacunar infarcts within bilateral thalami.   Left mastoid and middle ear opacification as well as partial right   mastoid opacification. Correlate clinically.    PHYSICAL EXAM  GENERAL: NAD,  HEAD:  NCAT, EOMI, MM  NECK: Supple, Nontender  NERVOUS SYSTEM:  AAOx1, NFD  CHEST/LUNG: +bs b/l, No wheezing   HEART: +s1s2 RRR  ABDOMEN: soft, NT/ND  EXTREMITIES:  pp, no edema  SKIN: age related skin changes     Agree  with assessment  except for changes below.     Vital Signs (24 Hrs):  T(C): 36 (07-12-23 @ 15:44), Max: 36.4 (07-12-23 @ 10:31)  HR: 55 (07-12-23 @ 15:44) (55 - 55)  BP: 151/82 (07-12-23 @ 15:44) (121/58 - 151/82)  RR: 18 (07-12-23 @ 15:44) (17 - 18)  SpO2: 98% (07-12-23 @ 15:44) (97% - 98%)      IMPRESSION  Acute Cystitis   Associated with Weakness  and Lethargy   Hx Multiple  Unwitnessed Falls Cardiologist Stopped  Antihypertensives  Appears Mildly Dehydrated s/p 1 L of Fluids   F/u cardiology   Fall Up Cultures   Continue  Anti Biotics for now   PT/Rehab   CT Head as  Above    tele monitoring for 24 hours  ECG  Noted  f/u ECHO and possible EEG  Check Orthostatics   Fall Precaution   GOC  Discussion in the  AM      Macrocytic  Anemia   cont B12, MTV  check folic acid, B12 levels    CKD 2-3  Monitor Cr/BUN Electrolytes including Phosphorus  Avoid Nephro toxins      Hx Hypothyroidism - c/w Synthroid     Hx HTN - c/w home medications     Hx HLD - Statin     Hx Glaucoma - c/w eye  drops    Seen  on 07/12/23

## 2023-07-12 NOTE — ED PROVIDER NOTE - IV ALTEPLASE ADMIN OUTSIDE HIDDEN
Telephone call #2 to patient to evaluate pain response to Medial Branch/Dorsal Ramus Block, Bilateral, Lumbar L3, L4, and L5, First Diagnostic Block performed by Alcon Eldridge MD on 12/7/2022.       Pre-procedure pain: 8  Immediate post-procedure pain: 4       The mailbox is full and can not accept any messages at this time.  Good by.       show

## 2023-07-12 NOTE — ED PROVIDER NOTE - BIRTH SEX
Patient transported to [address] [place] [address],[address] Manjinder Ashton RN  10/27/17 2013 Female

## 2023-07-12 NOTE — ED PROVIDER NOTE - OBJECTIVE STATEMENT
89-year-old female with past medical history of hypertension presenting for 1 week of weakness, unwitnessed fall, foul-smelling urine.  Son is the primary historian denies fever nausea vomiting chest pain shortness of breath , Diarrhea.

## 2023-07-12 NOTE — ED ADULT TRIAGE NOTE - CHIEF COMPLAINT QUOTE
as per son shes been very weak lately. he called dr nettles and said to come in for possible uti and hypotension"

## 2023-07-12 NOTE — ED PROVIDER NOTE - NS ED ATTENDING STATEMENT MOD
This was a shared visit with the SANTY. I reviewed and verified the documentation and independently performed the documented:

## 2023-07-12 NOTE — ED ADULT NURSE NOTE - NSFALLHARMRISKINTERV_ED_ALL_ED

## 2023-07-12 NOTE — ED PROVIDER NOTE - CLINICAL SUMMARY MEDICAL DECISION MAKING FREE TEXT BOX
Pt with decline over 2 weeks, generalized weakness and falling, found to have uti, started on abx.  will admit for abx and rehab/pt.  Any ordered labs and EKG were reviewed.  Any imaging was ordered and reviewed by me.  Appropriate medications for patient's presenting complaints were ordered and effects were reassessed.  Patient's records (prior hospital, ED visit, and/or nursing home notes if available) were reviewed.  Additional history was obtained from EMS, family, and/or PCP (where available).  Escalation to admission/observation was considered.   Patient requires inpatient hospitalization - monitored setting.

## 2023-07-12 NOTE — H&P ADULT - HISTORY OF PRESENT ILLNESS
Kay Kaufman is a 50 y.o. female  . Subjective: Other   This is a chronic (rheumatoid arthritis) problem. The current episode started more than 1 year ago. The problem occurs daily. The problem has been gradually worsening. Associated symptoms include arthralgias, fatigue and joint swelling. Pertinent negatives include no abdominal pain, chest pain, chills, congestion, coughing, diaphoresis, fever, headaches, myalgias, nausea, neck pain, numbness, rash, sore throat, vomiting or weakness. Associated symptoms comments: Multiple joint tenderness. Nothing aggravates the symptoms. She has tried NSAIDs for the symptoms. The treatment provided mild relief. Fatigue   This is a chronic problem. The current episode started more than 1 year ago. The problem occurs daily. The problem has been waxing and waning. Associated symptoms include arthralgias, fatigue and joint swelling. Pertinent negatives include no abdominal pain, chest pain, chills, congestion, coughing, diaphoresis, fever, headaches, myalgias, nausea, neck pain, numbness, rash, sore throat, vomiting or weakness. Exacerbated by: hypothyroidism  Treatments tried: thyroid treatment. The treatment provided moderate relief. Review of Systems   Constitutional: Positive for fatigue. Negative for activity change, appetite change, chills, diaphoresis, fever and unexpected weight change. HENT: Negative for congestion, dental problem, drooling, ear discharge, ear pain, facial swelling, hearing loss, mouth sores, nosebleeds, postnasal drip, rhinorrhea, sinus pressure, sneezing, sore throat, tinnitus, trouble swallowing and voice change. Eyes: Negative for photophobia, pain, discharge, redness, itching and visual disturbance. Respiratory: Negative for apnea, cough, choking, chest tightness, shortness of breath, wheezing and stridor. Cardiovascular: Negative for chest pain, palpitations and leg swelling.    Gastrointestinal: Negative for abdominal distention, abdominal pain, anal bleeding, blood in stool, constipation, diarrhea, nausea, rectal pain and vomiting. Endocrine: Negative for cold intolerance, heat intolerance, polydipsia, polyphagia and polyuria. Genitourinary: Negative for decreased urine volume, difficulty urinating, dyspareunia, dysuria, enuresis, flank pain, frequency, genital sores, hematuria, menstrual problem, pelvic pain, urgency, vaginal bleeding, vaginal discharge and vaginal pain. Musculoskeletal: Positive for arthralgias and joint swelling. Negative for back pain, gait problem, myalgias, neck pain and neck stiffness. Skin: Negative for color change, pallor, rash and wound. Allergic/Immunologic: Negative for environmental allergies, food allergies and immunocompromised state. Neurological: Negative for dizziness, tremors, seizures, syncope, facial asymmetry, speech difficulty, weakness, light-headedness, numbness and headaches. Hematological: Negative for adenopathy. Does not bruise/bleed easily. Psychiatric/Behavioral: Negative for agitation, behavioral problems, confusion, decreased concentration, dysphoric mood, hallucinations, self-injury, sleep disturbance and suicidal ideas. The patient is not nervous/anxious and is not hyperactive. Past Medical History:   Diagnosis Date    Hypothyroidism        Social History     Socioeconomic History    Marital status:      Spouse name: Not on file    Number of children: Not on file    Years of education: Not on file    Highest education level: Not on file   Occupational History    Not on file   Social Needs    Financial resource strain: Not on file    Food insecurity:     Worry: Not on file     Inability: Not on file    Transportation needs:     Medical: Not on file     Non-medical: Not on file   Tobacco Use    Smoking status: Never Smoker    Smokeless tobacco: Never Used   Substance and Sexual Activity    Alcohol use:  Yes     Alcohol/week: 2.4 oz 89yo F hx of HTN, hypothyroidism, glaucoma, GERD, HTN, HLD presenting to the hospital for 1 week of weakness.     Son provided majority of history. Her legs would "feel weak" leading to multiple unwitnessed falls (3x) in the bathroom, difficulty walking. Denies pain or HT. Because of the falls, her cardiologist Dr. Fox said to stop her htn meds (diltiazem, amlodipine). Losartan was continued. Denies tongue biting, urinary/fecal incontinence. No recent changes in medications, sick contacts, fevers, chest pain, palpitations, shortness of breath, wheezing, abdominal pain, diarrhea, nausea/vomiting. Per son, has been having foul-smelling urine.     At the ED, VSS. Labs significant for macrocytic anemia, hypernatremia (Na- 149(, elevated BUN. Lactate and lipase, trops (-). UA (+) for nitrites, LE and bacteria. CT-head (-) for acute pathology. Chest Xray (-) for opacities suspicious for PNA. Patient denies any pain in any parts of her body.     Admitted for acute cystitis and weakness. ceftriaxone 1g x1 given. 1L NS bolus given.  scleral icterus. Neck: Normal range of motion. Neck supple. No JVD present. No tracheal deviation present. No thyromegaly present. Cardiovascular: Normal rate, regular rhythm, normal heart sounds and intact distal pulses. Exam reveals no gallop and no friction rub. No murmur heard. Pulmonary/Chest: Effort normal and breath sounds normal. No stridor. No respiratory distress. She has no wheezes. She has no rales. She exhibits no tenderness. Abdominal: Soft. Bowel sounds are normal. She exhibits no distension and no mass. There is no tenderness. There is no rebound and no guarding. Genitourinary:   Genitourinary Comments: Will follow with own gynecologist for gynecological and breast care. Musculoskeletal: Normal range of motion. She exhibits no edema or tenderness. Multiple tender points noted to upper and lower extremities as well as trunk    Lymphadenopathy:     She has no cervical adenopathy. Neurological: She is alert and oriented to person, place, and time. She has normal reflexes. She displays normal reflexes. No cranial nerve deficit. She exhibits normal muscle tone. Coordination normal.   Skin: Skin is warm and dry. No rash noted. She is not diaphoretic. No erythema. No pallor. Psychiatric: She has a normal mood and affect. Her behavior is normal. Judgment and thought content normal.   Nursing note and vitals reviewed. Assessment / Plan:   [facility] was seen today for established new doctor. Diagnoses and all orders for this visit:    Acquired hypothyroidism  -     levothyroxine (SYNTHROID) 50 MCG tablet; Take 1 tablet by mouth Daily  -     CBC Auto Differential; Future  -     Comprehensive Metabolic Panel; Future  -     Hemoglobin A1C; Future  -     Lipid Panel; Future  -     TSH without Reflex; Future  -     T4, Free; Future  -     Vitamin B12 & Folate; Future  -     Measles Immune IgG; Future  -     Vitamin D 25 Hydroxy;  Future    Fatigue, unspecified type  -     levothyroxine (SYNTHROID) 50 MCG tablet; Take 1 tablet by mouth Daily  -     CBC Auto Differential; Future  -     Comprehensive Metabolic Panel; Future  -     Hemoglobin A1C; Future  -     Lipid Panel; Future  -     TSH without Reflex; Future  -     T4, Free; Future  -     Vitamin B12 & Folate; Future  -     Measles Immune IgG; Future  -     Vitamin D 25 Hydroxy; Future    Measles screening  -     CBC Auto Differential; Future  -     Comprehensive Metabolic Panel; Future  -     Hemoglobin A1C; Future  -     Lipid Panel; Future  -     TSH without Reflex; Future  -     T4, Free; Future  -     Vitamin B12 & Folate; Future  -     Measles Immune IgG; Future  -     Vitamin D 25 Hydroxy; Future    Rheumatoid arthritis involving multiple sites with positive rheumatoid factor (HCC)  -     hydroxychloroquine (PLAQUENIL) 200 MG tablet; Take 1 tablet by mouth daily        Willfollow with own gynecologist for gynecological and breast care. Reviewed health maintenance report. Patient is aware of deficiencies and suggested preventative tests.

## 2023-07-12 NOTE — ED ADULT NURSE REASSESSMENT NOTE - NS ED NURSE REASSESS COMMENT FT1
Received pt from previous RN, Pt AxOx2. Son present at bedside. Denies any pain nor discomfort. Incontinence care provided. NAD. Safety measures maintained. Bed alarm in place. Call bell within reach. Care to continue.

## 2023-07-12 NOTE — ED PROVIDER NOTE - ATTENDING APP SHARED VISIT CONTRIBUTION OF CARE
89 yo f with pmh of htn, hld, sent in by dr. ba for evaluation of weakness.  was told needs to r/o uti.  pt had been feeling weak x 2 weeks.  had a fall while in the bathroom on 7/4.  was told by dr. ba's PA to stop her htn meds.  family stopped diltiazem and amlodipine, but was not aware that losartan was also bp med, so she has been continuing that.  normal appetite, but decreased energy level and difficulty walking.  pt denies any pain.  denies head trauma.  no vomiting, diarrhea, fever or chills.  family noted her urine "smelled bad, like ammonia".  exam: nad, ncat, perrl, eomi, dry mm, rrr, ctab, abd soft, nt, nd aox3, no calf tenderness, no pitting edema, skin tenting imp: pt with generalized weakness for 2 weeks, falls and difficulty walking.  will r/o uti, labs, ivf, ct head

## 2023-07-13 LAB
A1C WITH ESTIMATED AVERAGE GLUCOSE RESULT: 5.7 % — HIGH (ref 4–5.6)
ALBUMIN SERPL ELPH-MCNC: 3.8 G/DL — SIGNIFICANT CHANGE UP (ref 3.5–5.2)
ALP SERPL-CCNC: 75 U/L — SIGNIFICANT CHANGE UP (ref 30–115)
ALT FLD-CCNC: 19 U/L — SIGNIFICANT CHANGE UP (ref 0–41)
ANION GAP SERPL CALC-SCNC: 15 MMOL/L — HIGH (ref 7–14)
AST SERPL-CCNC: 28 U/L — SIGNIFICANT CHANGE UP (ref 0–41)
BASOPHILS # BLD AUTO: 0.03 K/UL — SIGNIFICANT CHANGE UP (ref 0–0.2)
BASOPHILS NFR BLD AUTO: 0.6 % — SIGNIFICANT CHANGE UP (ref 0–1)
BILIRUB SERPL-MCNC: 0.5 MG/DL — SIGNIFICANT CHANGE UP (ref 0.2–1.2)
BUN SERPL-MCNC: 21 MG/DL — HIGH (ref 10–20)
CALCIUM SERPL-MCNC: 9.2 MG/DL — SIGNIFICANT CHANGE UP (ref 8.4–10.4)
CHLORIDE SERPL-SCNC: 103 MMOL/L — SIGNIFICANT CHANGE UP (ref 98–110)
CO2 SERPL-SCNC: 27 MMOL/L — SIGNIFICANT CHANGE UP (ref 17–32)
CREAT SERPL-MCNC: 0.7 MG/DL — SIGNIFICANT CHANGE UP (ref 0.7–1.5)
EGFR: 78 ML/MIN/1.73M2 — SIGNIFICANT CHANGE UP
EOSINOPHIL # BLD AUTO: 0.13 K/UL — SIGNIFICANT CHANGE UP (ref 0–0.7)
EOSINOPHIL NFR BLD AUTO: 2.4 % — SIGNIFICANT CHANGE UP (ref 0–8)
ESTIMATED AVERAGE GLUCOSE: 117 MG/DL — HIGH (ref 68–114)
FOLATE SERPL-MCNC: >20 NG/ML — SIGNIFICANT CHANGE UP
GLUCOSE SERPL-MCNC: 79 MG/DL — SIGNIFICANT CHANGE UP (ref 70–99)
HCT VFR BLD CALC: 42.7 % — SIGNIFICANT CHANGE UP (ref 37–47)
HGB BLD-MCNC: 14.1 G/DL — SIGNIFICANT CHANGE UP (ref 12–16)
IMM GRANULOCYTES NFR BLD AUTO: 0.2 % — SIGNIFICANT CHANGE UP (ref 0.1–0.3)
LYMPHOCYTES # BLD AUTO: 1.47 K/UL — SIGNIFICANT CHANGE UP (ref 1.2–3.4)
LYMPHOCYTES # BLD AUTO: 27.3 % — SIGNIFICANT CHANGE UP (ref 20.5–51.1)
MAGNESIUM SERPL-MCNC: 2 MG/DL — SIGNIFICANT CHANGE UP (ref 1.8–2.4)
MCHC RBC-ENTMCNC: 33 G/DL — SIGNIFICANT CHANGE UP (ref 32–37)
MCHC RBC-ENTMCNC: 33.3 PG — HIGH (ref 27–31)
MCV RBC AUTO: 100.7 FL — HIGH (ref 81–99)
MONOCYTES # BLD AUTO: 0.55 K/UL — SIGNIFICANT CHANGE UP (ref 0.1–0.6)
MONOCYTES NFR BLD AUTO: 10.2 % — HIGH (ref 1.7–9.3)
NEUTROPHILS # BLD AUTO: 3.2 K/UL — SIGNIFICANT CHANGE UP (ref 1.4–6.5)
NEUTROPHILS NFR BLD AUTO: 59.3 % — SIGNIFICANT CHANGE UP (ref 42.2–75.2)
NRBC # BLD: 0 /100 WBCS — SIGNIFICANT CHANGE UP (ref 0–0)
PLATELET # BLD AUTO: 149 K/UL — SIGNIFICANT CHANGE UP (ref 130–400)
PMV BLD: 11.4 FL — HIGH (ref 7.4–10.4)
POTASSIUM SERPL-MCNC: 3.6 MMOL/L — SIGNIFICANT CHANGE UP (ref 3.5–5)
POTASSIUM SERPL-SCNC: 3.6 MMOL/L — SIGNIFICANT CHANGE UP (ref 3.5–5)
PROT SERPL-MCNC: 6.3 G/DL — SIGNIFICANT CHANGE UP (ref 6–8)
RBC # BLD: 4.24 M/UL — SIGNIFICANT CHANGE UP (ref 4.2–5.4)
RBC # FLD: 13.7 % — SIGNIFICANT CHANGE UP (ref 11.5–14.5)
SODIUM SERPL-SCNC: 145 MMOL/L — SIGNIFICANT CHANGE UP (ref 135–146)
TROPONIN T SERPL-MCNC: <0.01 NG/ML — SIGNIFICANT CHANGE UP
TSH SERPL-MCNC: 17.89 UIU/ML — HIGH (ref 0.27–4.2)
VIT B12 SERPL-MCNC: 737 PG/ML — SIGNIFICANT CHANGE UP (ref 232–1245)
WBC # BLD: 5.39 K/UL — SIGNIFICANT CHANGE UP (ref 4.8–10.8)
WBC # FLD AUTO: 5.39 K/UL — SIGNIFICANT CHANGE UP (ref 4.8–10.8)

## 2023-07-13 PROCEDURE — 95819 EEG AWAKE AND ASLEEP: CPT | Mod: 26

## 2023-07-13 PROCEDURE — 99232 SBSQ HOSP IP/OBS MODERATE 35: CPT

## 2023-07-13 RX ORDER — SODIUM CHLORIDE 9 MG/ML
1000 INJECTION, SOLUTION INTRAVENOUS
Refills: 0 | Status: COMPLETED | OUTPATIENT
Start: 2023-07-13 | End: 2023-07-13

## 2023-07-13 RX ORDER — AMLODIPINE BESYLATE 2.5 MG/1
10 TABLET ORAL DAILY
Refills: 0 | Status: DISCONTINUED | OUTPATIENT
Start: 2023-07-13 | End: 2023-07-14

## 2023-07-13 RX ADMIN — Medication 2000 UNIT(S): at 11:16

## 2023-07-13 RX ADMIN — Medication 1 DROP(S): at 11:17

## 2023-07-13 RX ADMIN — Medication 5 MILLIGRAM(S): at 22:19

## 2023-07-13 RX ADMIN — LOSARTAN POTASSIUM 100 MILLIGRAM(S): 100 TABLET, FILM COATED ORAL at 05:38

## 2023-07-13 RX ADMIN — ENOXAPARIN SODIUM 40 MILLIGRAM(S): 100 INJECTION SUBCUTANEOUS at 05:38

## 2023-07-13 RX ADMIN — Medication 100 MICROGRAM(S): at 05:38

## 2023-07-13 RX ADMIN — ATORVASTATIN CALCIUM 40 MILLIGRAM(S): 80 TABLET, FILM COATED ORAL at 22:20

## 2023-07-13 RX ADMIN — CEFTRIAXONE 100 MILLIGRAM(S): 500 INJECTION, POWDER, FOR SOLUTION INTRAMUSCULAR; INTRAVENOUS at 11:17

## 2023-07-13 RX ADMIN — Medication 250 MILLIGRAM(S): at 11:16

## 2023-07-13 RX ADMIN — SODIUM CHLORIDE 70 MILLILITER(S): 9 INJECTION, SOLUTION INTRAVENOUS at 10:42

## 2023-07-13 RX ADMIN — Medication 1 TABLET(S): at 11:15

## 2023-07-13 NOTE — PATIENT PROFILE ADULT - FALL HARM RISK - HARM RISK INTERVENTIONS
Assistance with ambulation/Assistance OOB with selected safe patient handling equipment/Communicate Risk of Fall with Harm to all staff/Discuss with provider need for PT consult/Monitor for mental status changes/Monitor gait and stability/Move patient closer to nurses' station/Provide patient with walking aids - walker, cane, crutches/Reinforce activity limits and safety measures with patient and family/Reorient to person, place and time as needed/Tailored Fall Risk Interventions/Toileting schedule using arm’s reach rule for commode and bathroom/Use of alarms - bed, chair and/or voice tab/Visual Cue: Yellow wristband and red socks/Bed in lowest position, wheels locked, appropriate side rails in place/Call bell, personal items and telephone in reach/Instruct patient to call for assistance before getting out of bed or chair/Non-slip footwear when patient is out of bed/[place] to call system/Physically safe environment - no spills, clutter or unnecessary equipment/Purposeful Proactive Rounding/Room/bathroom lighting operational, light cord in reach

## 2023-07-13 NOTE — PHYSICAL THERAPY INITIAL EVALUATION ADULT - PERTINENT HX OF CURRENT PROBLEM, REHAB EVAL
89yo F hx of HTN, hypothyroidism, glaucoma, GERD, HTN, HLD presenting to the hospital for 1 week of weakness.     Son provided majority of history. Her legs would "feel weak" leading to multiple unwitnessed falls (3x) in the bathroom, difficulty walking. Denies pain or HT. Because of the falls, her cardiologist Dr. Fox said to stop her htn meds (diltiazem, amlodipine). Losartan was continued. Denies tongue biting, urinary/fecal incontinence. No recent changes in medications, sick contacts, fevers, chest pain, palpitations, shortness of breath, wheezing, abdominal pain, diarrhea, nausea/vomiting. Per son, has been having foul-smelling urine.     At the ED, VSS. Labs significant for macrocytic anemia, hypernatremia (Na- 149(, elevated BUN. Lactate and lipase, trops (-). UA (+) for nitrites, LE and bacteria. CT-head (-) for acute pathology. Chest Xray (-) for opacities suspicious for PNA. Patient denies any pain in any parts of her body.     Admitted for acute cystitis and weakness. ceftriaxone 1g x1 given. 1L NS bolus given.

## 2023-07-13 NOTE — PHYSICAL THERAPY INITIAL EVALUATION ADULT - GENERAL OBSERVATIONS, REHAB EVAL
930-953 am Chart reviewed. Pt. seen semirecline in bed , in No apparent distress , + IV lock, Prima fit device , Pt. alert and oriented X 4, Pt. agreed to activity/therapy.

## 2023-07-13 NOTE — PATIENT PROFILE ADULT - OVER THE PAST TWO WEEKS HAVE YOU FELT DOWN, DEPRESSED OR HOPELESS?
----- Message from Claudia Pitts sent at 5/23/2019 11:45 AM CDT -----  Contact: [phone] self  Pt states that the pharmacy did not receive her medication metroNIDAZOLE (FLAGYL) 500 MG and would like for to be resent to [facility] [place] [id number]  [place] [address]  [facility] [employer] [facility] [address]. Please notify pt when complete.        
Called patient NO answer left a voice message to  medication.  
Patient called back. I mentioned medication is ready to . Patient verbalized understanding.  
The medication is ready for .  
no

## 2023-07-13 NOTE — PROGRESS NOTE ADULT - SUBJECTIVE AND OBJECTIVE BOX
MANNY LOUIS  89y  [facility]-N ED Hold 0[address] A      Patient is a 89y old  Female who presents with a chief complaint of falls, urinary tract infection (2023 17:26)      INTERVAL HPI/OVERNIGHT EVENTS:        REVIEW OF SYSTEMS:        FAMILY HISTORY:    T(C): 37.4 (23 @ 07:48), Max: 37.4 (23 @ 07:48)  HR: 59 (23 @ 07:48) (55 - 61)  BP: 185/81 (23 @ 07:48) (121/58 - 185/81)  RR: 18 (23 @ 07:48) (17 - 18)  SpO2: 96% (23 @ 07:48) (96% - 98%)  Wt(kg): --Vital Signs Last 24 Hrs  T(C): 37.4 (2023 07:48), Max: 37.4 (2023 07:48)  T(F): 99.3 (2023 07:48), Max: 99.3 (2023 07:48)  HR: 59 (2023 07:48) (55 - 61)  BP: 185/81 (2023 07:48) (121/58 - 185/81)  BP(mean): --  RR: 18 (2023 07:48) (17 - 18)  SpO2: 96% (2023 07:48) (96% - 98%)    Parameters below as of 2023 07:48  Patient On (Oxygen Delivery Method): room air        PHYSICAL EXAM:  GENERAL: NAD, well-groomed, well-developed  HEAD:  Atraumatic, Normocephalic  EYES: EOMI, PERRLA, conjunctiva and sclera clear  ENMT: No tonsillar erythema, exudates, or enlargement; Moist mucous membranes, Good dentition, No lesions  NECK: Supple, No JVD, Normal thyroid  NERVOUS SYSTEM:  Alert & Oriented X3, Good concentration; Motor Strength 5/5 B/L upper and lower extremities; DTRs 2+ intact and symmetric  PULM: Clear to auscultation bilaterally  CARDIAC: Regular rate and rhythm; No murmurs, rubs, or gallops  GI: Soft, Nontender, Nondistended; Bowel sounds present  EXTREMITIES:  2+ Peripheral Pulses, No clubbing, cyanosis, or edema  LYMPH: No lymphadenopathy noted  SKIN: No rashes or lesions    Consultant(s) Notes Reviewed:  [x ] YES  [ ] NO  Care Discussed with Consultants/Other Providers [ x] YES  [ ] NO    LABS:                            14.1   5.39  )-----------( 149      ( 2023 07:41 )             42.7   07-12    149<H>  |  110  |  31<H>  ----------------------------<  164<H>  3.7   |  30  |  0.9    Ca    9.1      2023 11:35  Mg     2.0     -    TPro  5.3<L>  /  Alb  3.3<L>  /  TBili  0.3  /  DBili  x   /  AST  22  /  ALT  16  /  AlkPhos  63  07-12            ascorbic acid 250 milliGRAM(s) Oral daily  atorvastatin 40 milliGRAM(s) Oral at bedtime  cefTRIAXone   IVPB 1000 milliGRAM(s) IV Intermittent every 24 hours  cholecalciferol 2000 Unit(s) Oral daily  enoxaparin Injectable 40 milliGRAM(s) SubCutaneous every 24 hours  latanoprost 0.005% Ophthalmic Solution 1 Drop(s) Both EYES at bedtime  levothyroxine 100 MICROGram(s) Oral daily  losartan 100 milliGRAM(s) Oral daily  melatonin 5 milliGRAM(s) Oral at bedtime  multivitamin/minerals 1 Tablet(s) Oral daily  spironolactone 25 milliGRAM(s) Oral <User Schedule>  timolol 0.25% Solution 1 Drop(s) Both EYES daily  vitamin E 100 International Unit(s) Oral <User Schedule>    89yo F hx of HTN, hypothyroidism, glaucoma, GERD, HTN, HLD presenting to the hospital for 1 week of weakness. Found to have acute cystitis.    1. Gen Weakness/Lethargy  likely secondary to Acute cystitis  - Admit to medicine        - ECst degree av block          -Fall precaution         - CTH: negative   - Cont rocephin d:2  - Urine cx:pending   - PT eval:pending   - No clear loc. no need for echo    2. Unwitnissed fall likely due to dehydration in the setting of UTI complicated by acute hypernatremia  - Half NS at 100 ml/hr   - Encourage water intake     3. Macrocytic anemia  - cont B12, MTV  - check folic acid, B12 levels    4. Hypothyroidism  - TSH:pending  - cont synthroid    5. HTN  * per patient, Ruddy instructed patient to hold BP meds due to a fall. Diltiazem and amlodipine were held.  - Keep holding cardizem and amlodipine    - Cont  losartan and spironolactone    6. HLD  - cont statin    7. Glaucoma  - cont eye drops    #DVT ppx: lovenox  #GI ppx: protonix  #Diet: DASH/TLC  #Activity: AAT  #Code: full  #Dispo: acute. follow up UCx. cont abx        MANNY LOUIS  89y  [facility]-N ED Hold 043 A      Patient is a 89y old  Female who presents with a chief complaint of falls, urinary tract infection (2023 17:26)      INTERVAL HPI/OVERNIGHT EVENTS:    Patient feels well. She has no complains   her dysuria is improving   she looks dehydrated. no other events noted           FAMILY HISTORY:    T(C): 37.4 (23 @ 07:48), Max: 37.4 (23 @ 07:48)  HR: 59 (23 @ 07:48) (55 - 61)  BP: 185/81 (23 @ 07:48) (121/58 - 185/81)  RR: 18 (23 @ 07:48) (17 - 18)  SpO2: 96% (23 @ 07:48) (96% - 98%)  Wt(kg): --Vital Signs Last 24 Hrs  T(C): 37.4 (2023 07:48), Max: 37.4 (2023 07:48)  T(F): 99.3 (2023 07:48), Max: 99.3 (2023 07:48)  HR: 59 (2023 07:48) (55 - 61)  BP: 185/81 (2023 07:48) (121/58 - 185/81)  BP(mean): --  RR: 18 (2023 07:48) (17 - 18)  SpO2: 96% (2023 07:48) (96% - 98%)    Parameters below as of 2023 07:48  Patient On (Oxygen Delivery Method): room air        PHYSICAL EXAM:  GENERAL: NAD, well-groomed, well-developed  NERVOUS SYSTEM:  Alert & Oriented X3  PULM: Clear to auscultation bilaterally  CARDIAC: Regular rate and rhythm;  GI: Soft, Nontender, Nondistended; Bowel sounds present  EXTREMITIES: Chronic changes     Consultant(s) Notes Reviewed:  [x ] YES  [ ] NO  Care Discussed with Consultants/Other Providers [ x] YES  [ ] NO    LABS:                            14.1   5.39  )-----------( 149      ( 2023 07:41 )             42.7   07-12    149<H>  |  110  |  31<H>  ----------------------------<  164<H>  3.7   |  30  |  0.9    Ca    9.1      2023 11:35  Mg     2.0         TPro  5.3<L>  /  Alb  3.3<L>  /  TBili  0.3  /  DBili  x   /  AST  22  /  ALT  16  /  AlkPhos  63              ascorbic acid 250 milliGRAM(s) Oral daily  atorvastatin 40 milliGRAM(s) Oral at bedtime  cefTRIAXone   IVPB 1000 milliGRAM(s) IV Intermittent every 24 hours  cholecalciferol 2000 Unit(s) Oral daily  enoxaparin Injectable 40 milliGRAM(s) SubCutaneous every 24 hours  latanoprost 0.005% Ophthalmic Solution 1 Drop(s) Both EYES at bedtime  levothyroxine 100 MICROGram(s) Oral daily  losartan 100 milliGRAM(s) Oral daily  melatonin 5 milliGRAM(s) Oral at bedtime  multivitamin/minerals 1 Tablet(s) Oral daily  spironolactone 25 milliGRAM(s) Oral <User Schedule>  timolol 0.25% Solution 1 Drop(s) Both EYES daily  vitamin E 100 International Unit(s) Oral <User Schedule>    89yo F hx of HTN, hypothyroidism, glaucoma, GERD, HTN, HLD presenting to the hospital for 1 week of weakness. Found to have acute cystitis.    1. Gen Weakness/Lethargy  likely secondary to Acute cystitis  - Admit to medicine        - ECst degree av block          -Fall precaution         - CTH: negative   - Cont rocephin d:2  - Urine cx:pending   - PT eval:  a) Home with services   - No clear loc. no need for echo    2. Unwitnissed fall likely due to dehydration in the setting of UTI complicated by acute hypernatremia  - Half NS at 100 ml/hr   - Encourage water intake     3. Macrocytic anemia  - cont B12, MTV  - check folic acid, B12 levels    4. Hypothyroidism  - TSH:17.89        -FT4:pending   - Cont synthroid 100mcg daily (likely need to be increase to 112-125mcg awaiting FT4)     5. HTN slihglty uncontrolled   * per patient, Ruddy instructed patient to hold BP meds due to a fall. Diltiazem and amlodipine were held.  - Keep holding cardizem   - Resume amlodipine 10mg po HS   - Cont  losartan and spironolactone    6. HLD  - cont statin    7. Glaucoma  - cont eye drops    #DVT ppx: lovenox  #GI ppx: protonix  #Diet: DASH/TLC  #Activity: AAT  #Code: full  #Dispo: possible dc in am

## 2023-07-14 LAB
ANION GAP SERPL CALC-SCNC: 11 MMOL/L — SIGNIFICANT CHANGE UP (ref 7–14)
BUN SERPL-MCNC: 20 MG/DL — SIGNIFICANT CHANGE UP (ref 10–20)
CALCIUM SERPL-MCNC: 8.7 MG/DL — SIGNIFICANT CHANGE UP (ref 8.4–10.5)
CHLORIDE SERPL-SCNC: 101 MMOL/L — SIGNIFICANT CHANGE UP (ref 98–110)
CO2 SERPL-SCNC: 30 MMOL/L — SIGNIFICANT CHANGE UP (ref 17–32)
CREAT SERPL-MCNC: 0.8 MG/DL — SIGNIFICANT CHANGE UP (ref 0.7–1.5)
EGFR: 66 ML/MIN/1.73M2 — SIGNIFICANT CHANGE UP
GLUCOSE SERPL-MCNC: 84 MG/DL — SIGNIFICANT CHANGE UP (ref 70–99)
HCT VFR BLD CALC: 42.4 % — SIGNIFICANT CHANGE UP (ref 37–47)
HGB BLD-MCNC: 14 G/DL — SIGNIFICANT CHANGE UP (ref 12–16)
MAGNESIUM SERPL-MCNC: 1.8 MG/DL — SIGNIFICANT CHANGE UP (ref 1.8–2.4)
MCHC RBC-ENTMCNC: 32.6 PG — HIGH (ref 27–31)
MCHC RBC-ENTMCNC: 33 G/DL — SIGNIFICANT CHANGE UP (ref 32–37)
MCV RBC AUTO: 98.6 FL — SIGNIFICANT CHANGE UP (ref 81–99)
NRBC # BLD: 0 /100 WBCS — SIGNIFICANT CHANGE UP (ref 0–0)
PLATELET # BLD AUTO: 168 K/UL — SIGNIFICANT CHANGE UP (ref 130–400)
PMV BLD: 11.5 FL — HIGH (ref 7.4–10.4)
POTASSIUM SERPL-MCNC: 3.2 MMOL/L — LOW (ref 3.5–5)
POTASSIUM SERPL-SCNC: 3.2 MMOL/L — LOW (ref 3.5–5)
RBC # BLD: 4.3 M/UL — SIGNIFICANT CHANGE UP (ref 4.2–5.4)
RBC # FLD: 13.5 % — SIGNIFICANT CHANGE UP (ref 11.5–14.5)
SODIUM SERPL-SCNC: 142 MMOL/L — SIGNIFICANT CHANGE UP (ref 135–146)
T4 FREE SERPL-MCNC: 1.5 NG/DL — SIGNIFICANT CHANGE UP (ref 0.9–1.8)
WBC # BLD: 6.33 K/UL — SIGNIFICANT CHANGE UP (ref 4.8–10.8)
WBC # FLD AUTO: 6.33 K/UL — SIGNIFICANT CHANGE UP (ref 4.8–10.8)

## 2023-07-14 PROCEDURE — 99232 SBSQ HOSP IP/OBS MODERATE 35: CPT

## 2023-07-14 PROCEDURE — 99223 1ST HOSP IP/OBS HIGH 75: CPT

## 2023-07-14 RX ORDER — LOSARTAN POTASSIUM 100 MG/1
50 TABLET, FILM COATED ORAL DAILY
Refills: 0 | Status: DISCONTINUED | OUTPATIENT
Start: 2023-07-15 | End: 2023-07-17

## 2023-07-14 RX ORDER — AMLODIPINE BESYLATE 2.5 MG/1
5 TABLET ORAL DAILY
Refills: 0 | Status: DISCONTINUED | OUTPATIENT
Start: 2023-07-15 | End: 2023-07-17

## 2023-07-14 RX ORDER — POTASSIUM CHLORIDE 20 MEQ
40 PACKET (EA) ORAL ONCE
Refills: 0 | Status: COMPLETED | OUTPATIENT
Start: 2023-07-14 | End: 2023-07-14

## 2023-07-14 RX ADMIN — Medication 5 MILLIGRAM(S): at 22:20

## 2023-07-14 RX ADMIN — SPIRONOLACTONE 25 MILLIGRAM(S): 25 TABLET, FILM COATED ORAL at 05:25

## 2023-07-14 RX ADMIN — Medication 100 MICROGRAM(S): at 05:25

## 2023-07-14 RX ADMIN — ATORVASTATIN CALCIUM 40 MILLIGRAM(S): 80 TABLET, FILM COATED ORAL at 22:19

## 2023-07-14 RX ADMIN — CEFTRIAXONE 100 MILLIGRAM(S): 500 INJECTION, POWDER, FOR SOLUTION INTRAMUSCULAR; INTRAVENOUS at 11:23

## 2023-07-14 RX ADMIN — ENOXAPARIN SODIUM 40 MILLIGRAM(S): 100 INJECTION SUBCUTANEOUS at 05:25

## 2023-07-14 RX ADMIN — Medication 40 MILLIEQUIVALENT(S): at 17:06

## 2023-07-14 RX ADMIN — Medication 1 DROP(S): at 12:15

## 2023-07-14 RX ADMIN — Medication 1 TABLET(S): at 11:24

## 2023-07-14 RX ADMIN — LOSARTAN POTASSIUM 100 MILLIGRAM(S): 100 TABLET, FILM COATED ORAL at 05:25

## 2023-07-14 RX ADMIN — AMLODIPINE BESYLATE 10 MILLIGRAM(S): 2.5 TABLET ORAL at 05:25

## 2023-07-14 RX ADMIN — Medication 2000 UNIT(S): at 11:24

## 2023-07-14 RX ADMIN — LATANOPROST 1 DROP(S): 0.05 SOLUTION/ DROPS OPHTHALMIC; TOPICAL at 22:21

## 2023-07-14 NOTE — PROGRESS NOTE ADULT - SUBJECTIVE AND OBJECTIVE BOX
MANNY LOUIS 89y Female  MRN#: [id number]   Hospital Day: 2d    SUBJECTIVE  Patient is a 89y old Female who presents with a chief complaint of falls, urinary tract infection (14 Jul 2023 12:40)  Currently admitted to medicine with the primary diagnosis of Acute UTI      INTERVAL HPI AND OVERNIGHT EVENTS:  Patient was examined and seen at bedside. This morning she is resting comfortably in bed and reports no issues or overnight events.      OBJECTIVE  PAST MEDICAL & SURGICAL HISTORY  Hypertension    Hypothyroidism    Hyperlipidemia    Glaucoma      ALLERGIES:  No Known Allergies    MEDICATIONS:  STANDING MEDICATIONS  amLODIPine   Tablet 10 milliGRAM(s) Oral daily  atorvastatin 40 milliGRAM(s) Oral at bedtime  cefTRIAXone   IVPB 1000 milliGRAM(s) IV Intermittent every 24 hours  cholecalciferol 2000 Unit(s) Oral daily  enoxaparin Injectable 40 milliGRAM(s) SubCutaneous every 24 hours  latanoprost 0.005% Ophthalmic Solution 1 Drop(s) Both EYES at bedtime  levothyroxine 100 MICROGram(s) Oral daily  losartan 100 milliGRAM(s) Oral daily  melatonin 5 milliGRAM(s) Oral at bedtime  multivitamin/minerals 1 Tablet(s) Oral daily  spironolactone 25 milliGRAM(s) Oral <User Schedule>  timolol 0.25% Solution 1 Drop(s) Both EYES daily    PRN MEDICATIONS      VITAL SIGNS: Last 24 Hours  T(C): 36.2 (14 Jul 2023 13:00), Max: 37 (13 Jul 2023 15:43)  T(F): 97.2 (14 Jul 2023 13:00), Max: 98.6 (13 Jul 2023 15:43)  HR: 56 (14 Jul 2023 13:00) (54 - 91)  BP: 83/52 (14 Jul 2023 13:00) (70/39 - 191/88)  BP(mean): 49 (14 Jul 2023 10:54) (49 - 49)  RR: 18 (14 Jul 2023 13:00) (17 - 19)  SpO2: 96% (14 Jul 2023 13:00) (95% - 97%)    LABS:                        14.0   6.33  )-----------( 168      ( 14 Jul 2023 07:29 )             42.4     07-14    142  |  101  |  20  ----------------------------<  84  3.2<L>   |  30  |  0.8    Ca    8.7      14 Jul 2023 07:29  Mg     1.8     07-14    TPro  6.3  /  Alb  3.8  /  TBili  0.5  /  DBili  x   /  AST  28  /  ALT  19  /  AlkPhos  75  07-13      Urinalysis Basic - ( 14 Jul 2023 07:29 )    Color: x / Appearance: x / SG: x / pH: x  Gluc: 84 mg/dL / Ketone: x  / Bili: x / Urobili: x   Blood: x / Protein: x / Nitrite: x   Leuk Esterase: x / RBC: x / WBC x   Sq Epi: x / Non Sq Epi: x / Bacteria: x            Culture - Urine (collected 12 Jul 2023 11:35)  Source: Clean Catch Clean Catch (Midstream)  Preliminary Report (14 Jul 2023 12:16):    >100,000 CFU/ml Escherichia coli      CARDIAC MARKERS ( 13 Jul 2023 07:41 )  x     / <0.01 ng/mL / x     / x     / x      CARDIAC MARKERS ( 12 Jul 2023 21:02 )  x     / <0.01 ng/mL / x     / x     / x          RADIOLOGY:      PHYSICAL EXAM:  CONSTITUTIONAL: No acute distress, well-developed, well-groomed, AAOx3  HEAD: Atraumatic, normocephalic  EYES: EOM intact, PERRLA, conjunctiva and sclera clear  ENT: Supple, no masses, no thyromegaly, no bruits, no JVD; moist mucous membranes  PULMONARY: Clear to auscultation bilaterally; no wheezes, rales, or rhonchi  CARDIOVASCULAR: Regular rate and rhythm; no murmurs, rubs, or gallops  GASTROINTESTINAL: Soft, non-tender, non-distended; bowel sounds present  MUSCULOSKELETAL: 2+ peripheral pulses; no clubbing, no cyanosis, no edema  NEUROLOGY: non-focal  SKIN: No rashes or lesions; warm and dry    ASSESSMENT & PLAN:  Patient is a 89 yo F with PMHx of HTN, hypothyroidism, glaucoma, GERD, HTN, HLD presenting to the hospital for 1 week of weakness. Found to have acute cystitis.      #Gen Weakness/Lethargy  likely secondary to Acute cystitis  #Unwitnissed fall  - EKG (7/12/23 @ 11:51): Sinus bradycardia with 1st degree A-V block; Left axis deviation; Possible Septal infarct, age undetermined  - CT Brain (7/12/23 @ 13:38): No evidence of acute intracranial pathology; Severe chronic microvascular changes and chronic lacunar infarcts within bilateral thalami  - Culture - Urine (07.12.23 @ 11:35) >100,000 CFU/ml Escherichia coli  - Fall precaution  - Rocephin day3, will finish today  - pending PT eval      #HTN  #Hx of moderate to severe AS  - unstable BP, episodes of hypotension  - hold cardizem  - Resume amlodipine 10mg po HS  - Cont losartan and spironolactone  - Cardio recommend to space out BP medications to prevent drops - can give Losartan in AM and Amlodipine in PM  - Unclear if recurrent falls are due to syncope but low suspicion  - per Cardio, falls unlikely related to AS as it has been stable on TTE for years, last in 2019 in office showed stable moderate to severe AS      #Macrocytic anemia  - Mean Cell Volume: 98.6 fL (07.14.23 @ 07:29)  - Folate >20.0 ng/mL, Vitamin B12 737 pg/mL (07.13.23 @ 07:41)    #Hypothyroidism  - TSH 17.89 uIU/mL (07.13.23 @ 07:41)  - pending FT4  - Cont synthroid 100mcg daily    #HLD  - cont atorvastatin    #Glaucoma  - cont eye drops          #Misc  - DVT Prophylaxis: lovenox  - GI Prophylaxis: protonix  - Diet: DASH/TLC  - Activity: AAT  - Code Status: full    - Pending PT eval MANNY LOUIS 89y Female  MRN#: [id number]   Hospital Day: 2d    SUBJECTIVE  Patient is a 89y old Female who presents with a chief complaint of falls, urinary tract infection (14 Jul 2023 12:40)  Currently admitted to medicine with the primary diagnosis of Acute UTI      INTERVAL HPI AND OVERNIGHT EVENTS:  Patient was examined and seen at bedside. This morning she is resting comfortably in bed and reports no issues or overnight events.      OBJECTIVE  PAST MEDICAL & SURGICAL HISTORY  Hypertension    Hypothyroidism    Hyperlipidemia    Glaucoma      ALLERGIES:  No Known Allergies    MEDICATIONS:  STANDING MEDICATIONS  amLODIPine   Tablet 10 milliGRAM(s) Oral daily  atorvastatin 40 milliGRAM(s) Oral at bedtime  cefTRIAXone   IVPB 1000 milliGRAM(s) IV Intermittent every 24 hours  cholecalciferol 2000 Unit(s) Oral daily  enoxaparin Injectable 40 milliGRAM(s) SubCutaneous every 24 hours  latanoprost 0.005% Ophthalmic Solution 1 Drop(s) Both EYES at bedtime  levothyroxine 100 MICROGram(s) Oral daily  losartan 100 milliGRAM(s) Oral daily  melatonin 5 milliGRAM(s) Oral at bedtime  multivitamin/minerals 1 Tablet(s) Oral daily  spironolactone 25 milliGRAM(s) Oral <User Schedule>  timolol 0.25% Solution 1 Drop(s) Both EYES daily    PRN MEDICATIONS      VITAL SIGNS: Last 24 Hours  T(C): 36.2 (14 Jul 2023 13:00), Max: 37 (13 Jul 2023 15:43)  T(F): 97.2 (14 Jul 2023 13:00), Max: 98.6 (13 Jul 2023 15:43)  HR: 56 (14 Jul 2023 13:00) (54 - 91)  BP: 83/52 (14 Jul 2023 13:00) (70/39 - 191/88)  BP(mean): 49 (14 Jul 2023 10:54) (49 - 49)  RR: 18 (14 Jul 2023 13:00) (17 - 19)  SpO2: 96% (14 Jul 2023 13:00) (95% - 97%)    LABS:                        14.0   6.33  )-----------( 168      ( 14 Jul 2023 07:29 )             42.4     07-14    142  |  101  |  20  ----------------------------<  84  3.2<L>   |  30  |  0.8    Ca    8.7      14 Jul 2023 07:29  Mg     1.8     07-14    TPro  6.3  /  Alb  3.8  /  TBili  0.5  /  DBili  x   /  AST  28  /  ALT  19  /  AlkPhos  75  07-13      Urinalysis Basic - ( 14 Jul 2023 07:29 )    Color: x / Appearance: x / SG: x / pH: x  Gluc: 84 mg/dL / Ketone: x  / Bili: x / Urobili: x   Blood: x / Protein: x / Nitrite: x   Leuk Esterase: x / RBC: x / WBC x   Sq Epi: x / Non Sq Epi: x / Bacteria: x            Culture - Urine (collected 12 Jul 2023 11:35)  Source: Clean Catch Clean Catch (Midstream)  Preliminary Report (14 Jul 2023 12:16):    >100,000 CFU/ml Escherichia coli      CARDIAC MARKERS ( 13 Jul 2023 07:41 )  x     / <0.01 ng/mL / x     / x     / x      CARDIAC MARKERS ( 12 Jul 2023 21:02 )  x     / <0.01 ng/mL / x     / x     / x          RADIOLOGY:      PHYSICAL EXAM:  CONSTITUTIONAL: No acute distress, well-developed, well-groomed, AAOx3  HEAD: Atraumatic, normocephalic  EYES: EOM intact, PERRLA, conjunctiva and sclera clear  ENT: Supple, no masses, no thyromegaly, no bruits, no JVD; moist mucous membranes  PULMONARY: Clear to auscultation bilaterally; no wheezes, rales, or rhonchi  CARDIOVASCULAR: Regular rate and rhythm; no murmurs, rubs, or gallops  GASTROINTESTINAL: Soft, non-tender, non-distended; bowel sounds present  MUSCULOSKELETAL: 2+ peripheral pulses; no clubbing, no cyanosis, no edema  NEUROLOGY: non-focal  SKIN: No rashes or lesions; warm and dry    ASSESSMENT & PLAN:  Patient is a 89 yo F with PMHx of HTN, hypothyroidism, glaucoma, GERD, HTN, HLD presenting to the hospital for 1 week of weakness. Found to have acute cystitis.      #Gen Weakness/Lethargy  likely secondary to Acute cystitis  #Unwitnissed fall  - EKG (7/12/23 @ 11:51): Sinus bradycardia with 1st degree A-V block; Left axis deviation; Possible Septal infarct, age undetermined  - CT Brain (7/12/23 @ 13:38): No evidence of acute intracranial pathology; Severe chronic microvascular changes and chronic lacunar infarcts within bilateral thalami  - Culture - Urine (07.12.23 @ 11:35) >100,000 CFU/ml Escherichia coli  - Fall precaution  - Rocephin day3, will finish today  - pending PT eval      #HTN  #Hx of moderate to severe AS  - unstable BP, episodes of hypotension  - hold cardizem  - Resume amlodipine 10mg po HS  - Cont losartan and spironolactone  - Cardio recommend to space out BP medications to prevent drops - can give Losartan in AM and Amlodipine in PM  - Unclear if recurrent falls are due to syncope but low suspicion  - per Cardio, falls unlikely related to AS as it has been stable on TTE for years, last in 2019 in office showed stable moderate to severe AS      #Anemia  - Mean Cell Volume: 98.6 fL (07.14.23 @ 07:29)  - Folate >20.0 ng/mL, Vitamin B12 737 pg/mL (07.13.23 @ 07:41)  - pending iron panel    #Hypothyroidism  - TSH 17.89 uIU/mL (07.13.23 @ 07:41)  - pending FT4  - Cont synthroid 100mcg daily    #HLD  - cont atorvastatin    #Glaucoma  - cont eye drops        #Misc  - DVT Prophylaxis: lovenox  - GI Prophylaxis: protonix  - Diet: DASH/TLC  - Activity: AAT  - Code Status: full    - Pending PT eval MANNY LOUIS 89y Female  MRN#: [id number]   Hospital Day: 2d    SUBJECTIVE  Patient is a 89y old Female who presents with a chief complaint of falls, urinary tract infection (14 Jul 2023 12:40)  Currently admitted to medicine with the primary diagnosis of Acute UTI      INTERVAL HPI AND OVERNIGHT EVENTS:  Patient was examined and seen at bedside. This morning she is resting comfortably in bed and reports no issues or overnight events.      OBJECTIVE  PAST MEDICAL & SURGICAL HISTORY  Hypertension    Hypothyroidism    Hyperlipidemia    Glaucoma      ALLERGIES:  No Known Allergies    MEDICATIONS:  STANDING MEDICATIONS  amLODIPine   Tablet 10 milliGRAM(s) Oral daily  atorvastatin 40 milliGRAM(s) Oral at bedtime  cefTRIAXone   IVPB 1000 milliGRAM(s) IV Intermittent every 24 hours  cholecalciferol 2000 Unit(s) Oral daily  enoxaparin Injectable 40 milliGRAM(s) SubCutaneous every 24 hours  latanoprost 0.005% Ophthalmic Solution 1 Drop(s) Both EYES at bedtime  levothyroxine 100 MICROGram(s) Oral daily  losartan 100 milliGRAM(s) Oral daily  melatonin 5 milliGRAM(s) Oral at bedtime  multivitamin/minerals 1 Tablet(s) Oral daily  spironolactone 25 milliGRAM(s) Oral <User Schedule>  timolol 0.25% Solution 1 Drop(s) Both EYES daily    PRN MEDICATIONS      VITAL SIGNS: Last 24 Hours  T(C): 36.2 (14 Jul 2023 13:00), Max: 37 (13 Jul 2023 15:43)  T(F): 97.2 (14 Jul 2023 13:00), Max: 98.6 (13 Jul 2023 15:43)  HR: 56 (14 Jul 2023 13:00) (54 - 91)  BP: 83/52 (14 Jul 2023 13:00) (70/39 - 191/88)  BP(mean): 49 (14 Jul 2023 10:54) (49 - 49)  RR: 18 (14 Jul 2023 13:00) (17 - 19)  SpO2: 96% (14 Jul 2023 13:00) (95% - 97%)    LABS:                        14.0   6.33  )-----------( 168      ( 14 Jul 2023 07:29 )             42.4     07-14    142  |  101  |  20  ----------------------------<  84  3.2<L>   |  30  |  0.8    Ca    8.7      14 Jul 2023 07:29  Mg     1.8     07-14    TPro  6.3  /  Alb  3.8  /  TBili  0.5  /  DBili  x   /  AST  28  /  ALT  19  /  AlkPhos  75  07-13      Urinalysis Basic - ( 14 Jul 2023 07:29 )    Color: x / Appearance: x / SG: x / pH: x  Gluc: 84 mg/dL / Ketone: x  / Bili: x / Urobili: x   Blood: x / Protein: x / Nitrite: x   Leuk Esterase: x / RBC: x / WBC x   Sq Epi: x / Non Sq Epi: x / Bacteria: x            Culture - Urine (collected 12 Jul 2023 11:35)  Source: Clean Catch Clean Catch (Midstream)  Preliminary Report (14 Jul 2023 12:16):    >100,000 CFU/ml Escherichia coli      CARDIAC MARKERS ( 13 Jul 2023 07:41 )  x     / <0.01 ng/mL / x     / x     / x      CARDIAC MARKERS ( 12 Jul 2023 21:02 )  x     / <0.01 ng/mL / x     / x     / x          RADIOLOGY:      PHYSICAL EXAM:  CONSTITUTIONAL: No acute distress, well-developed, well-groomed, AAOx3  HEAD: Atraumatic, normocephalic  EYES: EOM intact, PERRLA, conjunctiva and sclera clear  ENT: Supple, no masses, no thyromegaly, no bruits, no JVD; moist mucous membranes  PULMONARY: Clear to auscultation bilaterally; no wheezes, rales, or rhonchi  CARDIOVASCULAR: Regular rate and rhythm; no murmurs, rubs, or gallops  GASTROINTESTINAL: Soft, non-tender, non-distended; bowel sounds present  MUSCULOSKELETAL: 2+ peripheral pulses; no clubbing, no cyanosis, no edema  NEUROLOGY: non-focal  SKIN: No rashes or lesions; warm and dry    ASSESSMENT & PLAN:  Patient is a 89 yo F with PMHx of HTN, hypothyroidism, glaucoma, GERD, HTN, HLD presenting to the hospital for 1 week of weakness. Found to have acute cystitis.      #Unwitnissed falls  #Vasovagal syncope  - per son, patient falls after she goes to the bathroom and stands up  - EKG (7/12/23 @ 11:51): Sinus bradycardia with 1st degree A-V block; Left axis deviation; Possible Septal infarct, age undetermined  - CT Brain (7/12/23 @ 13:38): No evidence of acute intracranial pathology; Severe chronic microvascular changes and chronic lacunar infarcts within bilateral thalami  - Fall precaution  - pending PT eval    #Acute cystitis  - Urinalysis (07.12.23 @ 11:35): Urine Appearance Slightly Turbid; Nitrite Positive; Leukocyte Esterase Concentration Large  - Culture - Urine (07.12.23 @ 11:35) >100,000 CFU/ml Escherichia coli  - Rocephin day3, will finish today    #HTN  #Hx of moderate to severe AS  - unstable BP, episodes of hypotension  - Cont spironolactone  - per Cardio, space out BP medications to prevent drops - Losartan in AM and Amlodipine in PM  - per Cardio, falls unlikely related to AS as it has been stable on TTE for years, last in 2019 in office showed stable moderate to severe AS  - hold cardizem    #Anemia  - Mean Cell Volume: 98.6 fL (07.14.23 @ 07:29)  - Folate >20.0 ng/mL, Vitamin B12 737 pg/mL (07.13.23 @ 07:41)  - pending iron studies    #Hypothyroidism  - TSH 17.89 uIU/mL (07.13.23 @ 07:41)  - pending FT4  - Cont synthroid 100mcg daily    #HLD  - cont atorvastatin    #Glaucoma  - cont eye drops      #Misc  - DVT Prophylaxis: lovenox  - GI Prophylaxis: protonix  - Diet: DASH/TLC  - Activity: AAT  - Code Status: full    - Pending PT eval MANNY LOUIS 89y Female  MRN#: [id number]   Hospital Day: 2d    SUBJECTIVE  Patient is a 89y old Female who presents with a chief complaint of falls, urinary tract infection (14 Jul 2023 12:40)  Currently admitted to medicine with the primary diagnosis of Acute UTI      INTERVAL HPI AND OVERNIGHT EVENTS:  Patient was examined and seen at bedside. This morning she is resting comfortably in bed and reports no issues or overnight events.      OBJECTIVE  PAST MEDICAL & SURGICAL HISTORY  Hypertension    Hypothyroidism    Hyperlipidemia    Glaucoma      ALLERGIES:  No Known Allergies    MEDICATIONS:  STANDING MEDICATIONS  amLODIPine   Tablet 10 milliGRAM(s) Oral daily  atorvastatin 40 milliGRAM(s) Oral at bedtime  cefTRIAXone   IVPB 1000 milliGRAM(s) IV Intermittent every 24 hours  cholecalciferol 2000 Unit(s) Oral daily  enoxaparin Injectable 40 milliGRAM(s) SubCutaneous every 24 hours  latanoprost 0.005% Ophthalmic Solution 1 Drop(s) Both EYES at bedtime  levothyroxine 100 MICROGram(s) Oral daily  losartan 100 milliGRAM(s) Oral daily  melatonin 5 milliGRAM(s) Oral at bedtime  multivitamin/minerals 1 Tablet(s) Oral daily  spironolactone 25 milliGRAM(s) Oral <User Schedule>  timolol 0.25% Solution 1 Drop(s) Both EYES daily    PRN MEDICATIONS      VITAL SIGNS: Last 24 Hours  T(C): 36.2 (14 Jul 2023 13:00), Max: 37 (13 Jul 2023 15:43)  T(F): 97.2 (14 Jul 2023 13:00), Max: 98.6 (13 Jul 2023 15:43)  HR: 56 (14 Jul 2023 13:00) (54 - 91)  BP: 83/52 (14 Jul 2023 13:00) (70/39 - 191/88)  BP(mean): 49 (14 Jul 2023 10:54) (49 - 49)  RR: 18 (14 Jul 2023 13:00) (17 - 19)  SpO2: 96% (14 Jul 2023 13:00) (95% - 97%)    LABS:                        14.0   6.33  )-----------( 168      ( 14 Jul 2023 07:29 )             42.4     07-14    142  |  101  |  20  ----------------------------<  84  3.2<L>   |  30  |  0.8    Ca    8.7      14 Jul 2023 07:29  Mg     1.8     07-14    TPro  6.3  /  Alb  3.8  /  TBili  0.5  /  DBili  x   /  AST  28  /  ALT  19  /  AlkPhos  75  07-13      Urinalysis Basic - ( 14 Jul 2023 07:29 )    Color: x / Appearance: x / SG: x / pH: x  Gluc: 84 mg/dL / Ketone: x  / Bili: x / Urobili: x   Blood: x / Protein: x / Nitrite: x   Leuk Esterase: x / RBC: x / WBC x   Sq Epi: x / Non Sq Epi: x / Bacteria: x            Culture - Urine (collected 12 Jul 2023 11:35)  Source: Clean Catch Clean Catch (Midstream)  Preliminary Report (14 Jul 2023 12:16):    >100,000 CFU/ml Escherichia coli      CARDIAC MARKERS ( 13 Jul 2023 07:41 )  x     / <0.01 ng/mL / x     / x     / x      CARDIAC MARKERS ( 12 Jul 2023 21:02 )  x     / <0.01 ng/mL / x     / x     / x          RADIOLOGY:      PHYSICAL EXAM:  CONSTITUTIONAL: No acute distress, cachetic, AOx2  HEAD: Atraumatic, normocephalic  EYES: EOM intact, PERRLA, conjunctiva and sclera clear  ENT: Supple, no masses, no thyromegaly, no bruits, no JVD; moist mucous membranes  PULMONARY: Clear to auscultation bilaterally; no wheezes, rales, or rhonchi  CARDIOVASCULAR: Regular rate and rhythm; no murmurs, rubs, or gallops, no AS murmur auscultated, extremities are warm  GASTROINTESTINAL: Soft, non-tender, non-distended; bowel sounds present  MUSCULOSKELETAL: 2+ peripheral pulses; no clubbing, no cyanosis, no edema  NEUROLOGY: non-focal  SKIN: No rashes or lesions; warm and dry

## 2023-07-14 NOTE — CONSULT NOTE ADULT - SUBJECTIVE AND OBJECTIVE BOX
Outpt cardiologist: Dr. Fox    HPI:  89yo F hx of Moderate-Severe AS, hypothyroidism, glaucoma, GERD, HTN, HLD presenting to the hospital for 1 week of weakness.     Son provided majority of history. Her legs would "feel weak" leading to multiple unwitnessed falls (3x) in the bathroom, difficulty walking. Denies pain or HT. Because of the falls, her cardiologist Dr. Fox said to stop her htn meds (diltiazem, amlodipine). Losartan was continued. Denies tongue biting, urinary/fecal incontinence. No recent changes in medications, sick contacts, fevers, chest pain, palpitations, shortness of breath, wheezing, abdominal pain, diarrhea, nausea/vomiting. Per son, has been having foul-smelling urine.     At the ED, VSS. Labs significant for macrocytic anemia, hypernatremia (Na- 149(, elevated BUN. Lactate and lipase, trops (-). UA (+) for nitrites, LE and bacteria. CT-head (-) for acute pathology. Chest Xray (-) for opacities suspicious for PNA. Patient denies any pain in any parts of her body.     Admitted for acute cystitis and weakness. ceftriaxone 1g x1 given. 1L NS bolus given.  (12 Jul 2023 17:26)      Cardiology HPI:  Per son, pt has recurrent falls, unwitnessed. Is often found on floor of bathroom, awake, can recall event and describes feeling weak in her lower extremities, leading to falls. Otherwise, only has occasional dyspnea after ambulation. Denies pt endorsing palpitations, chest pain prior to admission. Pt currently confused, cannot provide much history.    PAST MEDICAL & SURGICAL HISTORY  Hypertension    Hypothyroidism    Hyperlipidemia    Glaucoma        FAMILY HISTORY:  FAMILY HISTORY:      SOCIAL HISTORY:  Social History:  (-) smoking, alcohol (12 Jul 2023 17:26)      ALLERGIES:  No Known Allergies      MEDICATIONS:  amLODIPine   Tablet 10 milliGRAM(s) Oral daily  atorvastatin 40 milliGRAM(s) Oral at bedtime  cefTRIAXone   IVPB 1000 milliGRAM(s) IV Intermittent every 24 hours  cholecalciferol 2000 Unit(s) Oral daily  enoxaparin Injectable 40 milliGRAM(s) SubCutaneous every 24 hours  latanoprost 0.005% Ophthalmic Solution 1 Drop(s) Both EYES at bedtime  levothyroxine 100 MICROGram(s) Oral daily  losartan 100 milliGRAM(s) Oral daily  melatonin 5 milliGRAM(s) Oral at bedtime  multivitamin/minerals 1 Tablet(s) Oral daily  spironolactone 25 milliGRAM(s) Oral <User Schedule>  timolol 0.25% Solution 1 Drop(s) Both EYES daily    PRN:      HOME MEDICATIONS:  Home Medications:  amLODIPine 5 mg oral tablet: 1 tab(s) orally once a day (12 Jul 2023 17:50)  atorvastatin 40 mg oral tablet: 1 tab(s) orally once a day (12 Jul 2023 17:50)  DilTIAZem (Eqv-Cardizem CD) 120 mg/24 hours oral capsule, extended release: 1 cap(s) orally once a day (12 Jul 2023 18:05)  famotidine 20 mg oral tablet: 1 tab(s) orally once a day (12 Jul 2023 17:49)  losartan 100 mg oral tablet: 1 tab(s) orally once a day (12 Jul 2023 17:50)  melatonin 5 mg oral tablet: 1 tab(s) orally once a day (at bedtime) (12 Jul 2023 17:50)  PreserVision AREDS 2 oral capsule: 1 cap(s) orally 2 times a day (12 Jul 2023 17:49)  spironolactone 25 mg oral tablet: 1 tab(s) orally 2 times a week Monday and Friday (12 Jul 2023 17:50)  Synthroid 100 mcg (0.1 mg) oral tablet: 1 tab(s) orally once a day (12 Jul 2023 17:38)  timolol maleate 0.25% preservative-free ophthalmic solution: 1 drop(s) in each affected eye once a day (12 Jul 2023 17:49)  Vitamin B12 Methylcobalamin 2000 mcg oral capsule: 1 cap(s) orally 2 times a week Monday and Friday (12 Jul 2023 17:50)  Vitamin C 250 mg oral tablet: 1 tab(s) orally once a day (12 Jul 2023 17:50)  Vitamin D3 50 mcg (2000 intl units) oral tablet: 1 tab(s) orally once a day (12 Jul 2023 17:50)  vitamin E 100 intl units oral tablet: 1 tab(s) orally 5 times a week every day except monday and friday (12 Jul 2023 17:50)  Xalatan 0.005% ophthalmic solution: 1 gram(s) in each eye once a day (12 Jul 2023 17:50)      VITALS:   T(F): 97.7 (07-14 @ 04:37), Max: 99.3 (07-13 @ 07:48)  HR: 54 (07-14 @ 10:54) (54 - 91)  BP: 70/39 (07-14 @ 10:54) (70/39 - 191/88)  BP(mean): 49 (07-14 @ 10:54) (49 - 49)  RR: 17 (07-14 @ 10:54) (17 - 19)  SpO2: 95% (07-14 @ 10:54) (95% - 98%)    I&O's Summary    13 Jul 2023 07:01  -  14 Jul 2023 07:00  --------------------------------------------------------  IN: 0 mL / OUT: 700 mL / NET: -700 mL        REVIEW OF SYSTEMS:  Unable to obtain as pt currently confused    PHYSICAL EXAM:  General: Not in distress.  Non-toxic appearing.   HEENT: - JVD  Cardio: regular, S1, S2, systolic murmur 3/6  Pulm: Clear air entry bilaterally, no wheezing, rales, rhonchi  Abdomen: Soft, non-tender, non-distended, BS+  Extremities: No edema in bilateral LE    LABS:                        14.0   6.33  )-----------( 168      ( 14 Jul 2023 07:29 )             42.4     07-14    142  |  101  |  20  ----------------------------<  84  3.2<L>   |  30  |  0.8    Ca    8.7      14 Jul 2023 07:29  Mg     1.8     07-14    TPro  6.3  /  Alb  3.8  /  TBili  0.5  /  DBili  x   /  AST  28  /  ALT  19  /  AlkPhos  75  07-13        CARDIAC MARKERS ( 13 Jul 2023 07:41 )  x     / <0.01 ng/mL / x     / x     / x      CARDIAC MARKERS ( 12 Jul 2023 21:02 )  x     / <0.01 ng/mL / x     / x     / x           Outpt cardiologist: Dr. Fox    HPI:  89yo F hx of Moderate-Severe AS, hypothyroidism, glaucoma, GERD, HTN, HLD presenting to the hospital for 1 week of weakness.     Son provided majority of history. Her legs would "feel weak" leading to multiple unwitnessed falls (3x) in the bathroom, difficulty walking. Denies pain or HT. Because of the falls, her cardiologist Dr. Fox said to stop her htn meds (diltiazem, amlodipine). Losartan was continued. Denies tongue biting, urinary/fecal incontinence. No recent changes in medications, sick contacts, fevers, chest pain, palpitations, shortness of breath, wheezing, abdominal pain, diarrhea, nausea/vomiting. Per son, has been having foul-smelling urine.     At the ED, VSS. Labs significant for macrocytic anemia, hypernatremia (Na- 149(, elevated BUN. Lactate and lipase, trops (-). UA (+) for nitrites, LE and bacteria. CT-head (-) for acute pathology. Chest Xray (-) for opacities suspicious for PNA. Patient denies any pain in any parts of her body.     Admitted for acute cystitis and weakness. ceftriaxone 1g x1 given. 1L NS bolus given.  (12 Jul 2023 17:26)      Cardiology HPI:  Per son, pt has recurrent falls, unwitnessed. Is often found on floor of bathroom, awake, can recall event and describes feeling weak in her lower extremities, leading to falls. Otherwise, only has occasional dyspnea after ambulation. Denies pt endorsing palpitations, chest pain prior to admission. Pt currently confused, cannot provide much history.      PAST MEDICAL & SURGICAL HISTORY  Hypertension    Hypothyroidism    Hyperlipidemia    Glaucoma      No pertinent family history of premature CAD in first degree relatives    SOCIAL HISTORY:  Social History:  (-) smoking, alcohol (12 Jul 2023 17:26)      ALLERGIES:  No Known Allergies      MEDICATIONS:  amLODIPine   Tablet 10 milliGRAM(s) Oral daily  atorvastatin 40 milliGRAM(s) Oral at bedtime  cefTRIAXone   IVPB 1000 milliGRAM(s) IV Intermittent every 24 hours  cholecalciferol 2000 Unit(s) Oral daily  enoxaparin Injectable 40 milliGRAM(s) SubCutaneous every 24 hours  latanoprost 0.005% Ophthalmic Solution 1 Drop(s) Both EYES at bedtime  levothyroxine 100 MICROGram(s) Oral daily  losartan 100 milliGRAM(s) Oral daily  melatonin 5 milliGRAM(s) Oral at bedtime  multivitamin/minerals 1 Tablet(s) Oral daily  spironolactone 25 milliGRAM(s) Oral <User Schedule>  timolol 0.25% Solution 1 Drop(s) Both EYES daily      Home Medications:  amLODIPine 5 mg oral tablet: 1 tab(s) orally once a day (12 Jul 2023 17:50)  atorvastatin 40 mg oral tablet: 1 tab(s) orally once a day (12 Jul 2023 17:50)  DilTIAZem (Eqv-Cardizem CD) 120 mg/24 hours oral capsule, extended release: 1 cap(s) orally once a day (12 Jul 2023 18:05)  famotidine 20 mg oral tablet: 1 tab(s) orally once a day (12 Jul 2023 17:49)  losartan 100 mg oral tablet: 1 tab(s) orally once a day (12 Jul 2023 17:50)  melatonin 5 mg oral tablet: 1 tab(s) orally once a day (at bedtime) (12 Jul 2023 17:50)  PreserVision AREDS 2 oral capsule: 1 cap(s) orally 2 times a day (12 Jul 2023 17:49)  spironolactone 25 mg oral tablet: 1 tab(s) orally 2 times a week Monday and Friday (12 Jul 2023 17:50)  Synthroid 100 mcg (0.1 mg) oral tablet: 1 tab(s) orally once a day (12 Jul 2023 17:38)  timolol maleate 0.25% preservative-free ophthalmic solution: 1 drop(s) in each affected eye once a day (12 Jul 2023 17:49)  Vitamin B12 Methylcobalamin 2000 mcg oral capsule: 1 cap(s) orally 2 times a week Monday and Friday (12 Jul 2023 17:50)  Vitamin C 250 mg oral tablet: 1 tab(s) orally once a day (12 Jul 2023 17:50)  Vitamin D3 50 mcg (2000 intl units) oral tablet: 1 tab(s) orally once a day (12 Jul 2023 17:50)  vitamin E 100 intl units oral tablet: 1 tab(s) orally 5 times a week every day except monday and friday (12 Jul 2023 17:50)  Xalatan 0.005% ophthalmic solution: 1 gram(s) in each eye once a day (12 Jul 2023 17:50)      VITALS:   T(F): 97.7 (07-14 @ 04:37), Max: 99.3 (07-13 @ 07:48)  HR: 54 (07-14 @ 10:54) (54 - 91)  BP: 70/39 (07-14 @ 10:54) (70/39 - 191/88)  BP(mean): 49 (07-14 @ 10:54) (49 - 49)  RR: 17 (07-14 @ 10:54) (17 - 19)  SpO2: 95% (07-14 @ 10:54) (95% - 98%)    I&O's Summary    13 Jul 2023 07:01  -  14 Jul 2023 07:00  --------------------------------------------------------  IN: 0 mL / OUT: 700 mL / NET: -700 mL        REVIEW OF SYSTEMS:  Unable to obtain as pt currently confused    PHYSICAL EXAM:  General: Not in distress.  Non-toxic appearing.   HEENT: - JVD  Cardio: regular, S1, S2, systolic murmur 3/6  Pulm: Clear air entry bilaterally, no wheezing, rales, rhonchi  Abdomen: Soft, non-tender, non-distended, BS+  Extremities: No edema in bilateral LE      LABS:                        14.0   6.33  )-----------( 168      ( 14 Jul 2023 07:29 )             42.4     07-14    142  |  101  |  20  ----------------------------<  84  3.2<L>   |  30  |  0.8    Ca    8.7      14 Jul 2023 07:29  Mg     1.8     07-14    TPro  6.3  /  Alb  3.8  /  TBili  0.5  /  DBili  x   /  AST  28  /  ALT  19  /  AlkPhos  75  07-13

## 2023-07-14 NOTE — CONSULT NOTE ADULT - ATTENDING COMMENTS
Patient is well known to me.    Generalized weakness, lethargy, AMS in the setting of a UTI and dehydration  Prior falls likely due to hypotension and/or vasovagal (typically occur in the bathroom)  Unknown if LOC - although family does not suspect  Mod-severe AS  No ACS  No CHF    Currently appears confused and doesn't recognize me.      - Abx for UTI  - Limit IVF  - Losartan decreased to 50 mg in AM  - Amlodipine decreased to 5 mg in PM  - No plan for any invasive cardiac procedures  - PT/Rehab

## 2023-07-14 NOTE — CONSULT NOTE ADULT - ASSESSMENT
#Recurrent Falls, Unwitnessed  #Hx of Moderate to Severe AS  #Episodes of Hypotension  #UTI  #Hx of HTN, DLD    Recommendations:  - Recommend to space out BP medications to prevent drops - can give Losartan in AM and Amlodipine in PM  - Unclear if recurrent falls are due to syncope but low suspicion  - Unlikely related to AS as it has been stable on TTE for years, last in 2019 in office showed stable moderate - severe AS   #Recurrent Falls, Unwitnessed  #Moderate to Severe AS  #Episodes of Hypotension  #UTI  #Hx of HTN, DLD      Recommendations:  - Recommend to space out BP medications to prevent drops - can give Losartan in AM and Amlodipine in PM  - Unclear if recurrent falls are due to syncope but low suspicion  - Unlikely related to AS

## 2023-07-15 LAB
-  AMIKACIN: SIGNIFICANT CHANGE UP
-  AMOXICILLIN/CLAVULANIC ACID: SIGNIFICANT CHANGE UP
-  AMPICILLIN/SULBACTAM: SIGNIFICANT CHANGE UP
-  AMPICILLIN: SIGNIFICANT CHANGE UP
-  AZTREONAM: SIGNIFICANT CHANGE UP
-  CEFAZOLIN: SIGNIFICANT CHANGE UP
-  CEFEPIME: SIGNIFICANT CHANGE UP
-  CEFOXITIN: SIGNIFICANT CHANGE UP
-  CEFTRIAXONE: SIGNIFICANT CHANGE UP
-  CEFUROXIME: SIGNIFICANT CHANGE UP
-  CIPROFLOXACIN: SIGNIFICANT CHANGE UP
-  ERTAPENEM: SIGNIFICANT CHANGE UP
-  GENTAMICIN: SIGNIFICANT CHANGE UP
-  IMIPENEM: SIGNIFICANT CHANGE UP
-  LEVOFLOXACIN: SIGNIFICANT CHANGE UP
-  MEROPENEM: SIGNIFICANT CHANGE UP
-  NITROFURANTOIN: SIGNIFICANT CHANGE UP
-  PIPERACILLIN/TAZOBACTAM: SIGNIFICANT CHANGE UP
-  TOBRAMYCIN: SIGNIFICANT CHANGE UP
-  TRIMETHOPRIM/SULFAMETHOXAZOLE: SIGNIFICANT CHANGE UP
ALBUMIN SERPL ELPH-MCNC: 3.4 G/DL — LOW (ref 3.5–5.2)
ALP SERPL-CCNC: 81 U/L — SIGNIFICANT CHANGE UP (ref 30–115)
ALT FLD-CCNC: 18 U/L — SIGNIFICANT CHANGE UP (ref 0–41)
ANION GAP SERPL CALC-SCNC: 12 MMOL/L — SIGNIFICANT CHANGE UP (ref 7–14)
AST SERPL-CCNC: 26 U/L — SIGNIFICANT CHANGE UP (ref 0–41)
BASOPHILS # BLD AUTO: 0.03 K/UL — SIGNIFICANT CHANGE UP (ref 0–0.2)
BASOPHILS NFR BLD AUTO: 0.4 % — SIGNIFICANT CHANGE UP (ref 0–1)
BILIRUB SERPL-MCNC: 0.4 MG/DL — SIGNIFICANT CHANGE UP (ref 0.2–1.2)
BUN SERPL-MCNC: 33 MG/DL — HIGH (ref 10–20)
CALCIUM SERPL-MCNC: 8.7 MG/DL — SIGNIFICANT CHANGE UP (ref 8.4–10.5)
CHLORIDE SERPL-SCNC: 103 MMOL/L — SIGNIFICANT CHANGE UP (ref 98–110)
CO2 SERPL-SCNC: 25 MMOL/L — SIGNIFICANT CHANGE UP (ref 17–32)
CREAT SERPL-MCNC: 1.3 MG/DL — SIGNIFICANT CHANGE UP (ref 0.7–1.5)
CULTURE RESULTS: SIGNIFICANT CHANGE UP
EGFR: 37 ML/MIN/1.73M2 — LOW
EOSINOPHIL # BLD AUTO: 0.11 K/UL — SIGNIFICANT CHANGE UP (ref 0–0.7)
EOSINOPHIL NFR BLD AUTO: 1.6 % — SIGNIFICANT CHANGE UP (ref 0–8)
FERRITIN SERPL-MCNC: 106 NG/ML — SIGNIFICANT CHANGE UP (ref 13–330)
GLUCOSE SERPL-MCNC: 80 MG/DL — SIGNIFICANT CHANGE UP (ref 70–99)
HCT VFR BLD CALC: 41.7 % — SIGNIFICANT CHANGE UP (ref 37–47)
HGB BLD-MCNC: 13.7 G/DL — SIGNIFICANT CHANGE UP (ref 12–16)
IMM GRANULOCYTES NFR BLD AUTO: 0.4 % — HIGH (ref 0.1–0.3)
IRON SATN MFR SERPL: 26 % — SIGNIFICANT CHANGE UP (ref 15–50)
IRON SATN MFR SERPL: 63 UG/DL — SIGNIFICANT CHANGE UP (ref 35–150)
LYMPHOCYTES # BLD AUTO: 1.54 K/UL — SIGNIFICANT CHANGE UP (ref 1.2–3.4)
LYMPHOCYTES # BLD AUTO: 22.8 % — SIGNIFICANT CHANGE UP (ref 20.5–51.1)
MCHC RBC-ENTMCNC: 32.4 PG — HIGH (ref 27–31)
MCHC RBC-ENTMCNC: 32.9 G/DL — SIGNIFICANT CHANGE UP (ref 32–37)
MCV RBC AUTO: 98.6 FL — SIGNIFICANT CHANGE UP (ref 81–99)
METHOD TYPE: SIGNIFICANT CHANGE UP
MONOCYTES # BLD AUTO: 0.75 K/UL — HIGH (ref 0.1–0.6)
MONOCYTES NFR BLD AUTO: 11.1 % — HIGH (ref 1.7–9.3)
NEUTROPHILS # BLD AUTO: 4.28 K/UL — SIGNIFICANT CHANGE UP (ref 1.4–6.5)
NEUTROPHILS NFR BLD AUTO: 63.7 % — SIGNIFICANT CHANGE UP (ref 42.2–75.2)
NRBC # BLD: 0 /100 WBCS — SIGNIFICANT CHANGE UP (ref 0–0)
ORGANISM # SPEC MICROSCOPIC CNT: SIGNIFICANT CHANGE UP
ORGANISM # SPEC MICROSCOPIC CNT: SIGNIFICANT CHANGE UP
PLATELET # BLD AUTO: 138 K/UL — SIGNIFICANT CHANGE UP (ref 130–400)
PMV BLD: 12 FL — HIGH (ref 7.4–10.4)
POTASSIUM SERPL-MCNC: 4.1 MMOL/L — SIGNIFICANT CHANGE UP (ref 3.5–5)
POTASSIUM SERPL-SCNC: 4.1 MMOL/L — SIGNIFICANT CHANGE UP (ref 3.5–5)
PROT SERPL-MCNC: 5.6 G/DL — LOW (ref 6–8)
RBC # BLD: 4.23 M/UL — SIGNIFICANT CHANGE UP (ref 4.2–5.4)
RBC # FLD: 13.9 % — SIGNIFICANT CHANGE UP (ref 11.5–14.5)
SODIUM SERPL-SCNC: 140 MMOL/L — SIGNIFICANT CHANGE UP (ref 135–146)
SPECIMEN SOURCE: SIGNIFICANT CHANGE UP
T4 FREE SERPL-MCNC: 1.5 NG/DL — SIGNIFICANT CHANGE UP (ref 0.9–1.8)
TIBC SERPL-MCNC: 246 UG/DL — SIGNIFICANT CHANGE UP (ref 220–430)
TRANSFERRIN SERPL-MCNC: 193 MG/DL — LOW (ref 200–360)
UIBC SERPL-MCNC: 183 UG/DL — SIGNIFICANT CHANGE UP (ref 110–370)
WBC # BLD: 6.74 K/UL — SIGNIFICANT CHANGE UP (ref 4.8–10.8)
WBC # FLD AUTO: 6.74 K/UL — SIGNIFICANT CHANGE UP (ref 4.8–10.8)

## 2023-07-15 PROCEDURE — 99232 SBSQ HOSP IP/OBS MODERATE 35: CPT

## 2023-07-15 RX ORDER — CEFTRIAXONE 500 MG/1
1000 INJECTION, POWDER, FOR SOLUTION INTRAMUSCULAR; INTRAVENOUS EVERY 24 HOURS
Refills: 0 | Status: DISCONTINUED | OUTPATIENT
Start: 2023-07-16 | End: 2023-07-17

## 2023-07-15 RX ORDER — LANOLIN ALCOHOL/MO/W.PET/CERES
3 CREAM (GRAM) TOPICAL AT BEDTIME
Refills: 0 | Status: DISCONTINUED | OUTPATIENT
Start: 2023-07-15 | End: 2023-07-17

## 2023-07-15 RX ORDER — LEVOTHYROXINE SODIUM 125 MCG
125 TABLET ORAL DAILY
Refills: 0 | Status: DISCONTINUED | OUTPATIENT
Start: 2023-07-16 | End: 2023-07-17

## 2023-07-15 RX ADMIN — Medication 1 DROP(S): at 17:56

## 2023-07-15 RX ADMIN — Medication 3 MILLIGRAM(S): at 21:46

## 2023-07-15 RX ADMIN — Medication 100 MICROGRAM(S): at 05:51

## 2023-07-15 RX ADMIN — CEFTRIAXONE 100 MILLIGRAM(S): 500 INJECTION, POWDER, FOR SOLUTION INTRAMUSCULAR; INTRAVENOUS at 13:03

## 2023-07-15 RX ADMIN — ATORVASTATIN CALCIUM 40 MILLIGRAM(S): 80 TABLET, FILM COATED ORAL at 21:47

## 2023-07-15 RX ADMIN — Medication 2000 UNIT(S): at 13:04

## 2023-07-15 RX ADMIN — ENOXAPARIN SODIUM 40 MILLIGRAM(S): 100 INJECTION SUBCUTANEOUS at 05:50

## 2023-07-15 RX ADMIN — Medication 1 TABLET(S): at 13:04

## 2023-07-15 RX ADMIN — LOSARTAN POTASSIUM 50 MILLIGRAM(S): 100 TABLET, FILM COATED ORAL at 05:51

## 2023-07-15 RX ADMIN — LATANOPROST 1 DROP(S): 0.05 SOLUTION/ DROPS OPHTHALMIC; TOPICAL at 21:47

## 2023-07-15 NOTE — PROGRESS NOTE ADULT - SUBJECTIVE AND OBJECTIVE BOX
MANNY LOUIS 89y Female  MRN#: [id number]       SUBJECTIVE  Patient is a 89y old Female who presents with a chief complaint of falls, urinary tract infection (14 Jul 2023 12:40)  Currently admitted to medicine with the primary diagnosis of Acute UTI      INTERVAL HPI AND OVERNIGHT EVENTS:  Patient was still sleeping this am with breakfast untouched. Had a waist restraint; no overnight events. In the afternoon was back to her baseline. Spoke ot CM and family agreed to STR      OBJECTIVE  PAST MEDICAL & SURGICAL HISTORY  Hypertension    Hypothyroidism    Hyperlipidemia    Glaucoma      ALLERGIES:  No Known Allergies    MEDICATIONS:  STANDING MEDICATIONS  amLODIPine   Tablet 10 milliGRAM(s) Oral daily  atorvastatin 40 milliGRAM(s) Oral at bedtime  cefTRIAXone   IVPB 1000 milliGRAM(s) IV Intermittent every 24 hours  cholecalciferol 2000 Unit(s) Oral daily  enoxaparin Injectable 40 milliGRAM(s) SubCutaneous every 24 hours  latanoprost 0.005% Ophthalmic Solution 1 Drop(s) Both EYES at bedtime  levothyroxine 100 MICROGram(s) Oral daily  losartan 100 milliGRAM(s) Oral daily  melatonin 5 milliGRAM(s) Oral at bedtime  multivitamin/minerals 1 Tablet(s) Oral daily  spironolactone 25 milliGRAM(s) Oral <User Schedule>  timolol 0.25% Solution 1 Drop(s) Both EYES daily    PRN MEDICATIONS      VITAL SIGNS: Last 24 Hours  T(C): 36.2 (14 Jul 2023 13:00), Max: 37 (13 Jul 2023 15:43)  T(F): 97.2 (14 Jul 2023 13:00), Max: 98.6 (13 Jul 2023 15:43)  HR: 56 (14 Jul 2023 13:00) (54 - 91)  BP: 83/52 (14 Jul 2023 13:00) (70/39 - 191/88)  BP(mean): 49 (14 Jul 2023 10:54) (49 - 49)  RR: 18 (14 Jul 2023 13:00) (17 - 19)  SpO2: 96% (14 Jul 2023 13:00) (95% - 97%)    LABS:                        14.0   6.33  )-----------( 168      ( 14 Jul 2023 07:29 )             42.4     07-14    142  |  101  |  20  ----------------------------<  84  3.2<L>   |  30  |  0.8    Ca    8.7      14 Jul 2023 07:29  Mg     1.8     07-14    TPro  6.3  /  Alb  3.8  /  TBili  0.5  /  DBili  x   /  AST  28  /  ALT  19  /  AlkPhos  75  07-13      Urinalysis Basic - ( 14 Jul 2023 07:29 )    Color: x / Appearance: x / SG: x / pH: x  Gluc: 84 mg/dL / Ketone: x  / Bili: x / Urobili: x   Blood: x / Protein: x / Nitrite: x   Leuk Esterase: x / RBC: x / WBC x   Sq Epi: x / Non Sq Epi: x / Bacteria: x            Culture - Urine (collected 12 Jul 2023 11:35)  Source: Clean Catch Clean Catch (Midstream)  Preliminary Report (14 Jul 2023 12:16):    >100,000 CFU/ml Escherichia coli      CARDIAC MARKERS ( 13 Jul 2023 07:41 )  x     / <0.01 ng/mL / x     / x     / x      CARDIAC MARKERS ( 12 Jul 2023 21:02 )  x     / <0.01 ng/mL / x     / x     / x          RADIOLOGY:      PHYSICAL EXAM:  CONSTITUTIONAL: No acute distress, cachetic, AOx2  HEAD: Atraumatic, normocephalic  ENT: Supple, no masses, no thyromegaly, no bruits, no JVD; moist mucous membranes  PULMONARY: Clear to auscultation bilaterally; no wheezes, rales, or rhonchi  CARDIOVASCULAR: Regular rate and rhythm; no murmurs, rubs, or gallops, no AS murmur auscultated, extremities are warm  GASTROINTESTINAL: Soft, non-tender, non-distended; bowel sounds present  MUSCULOSKELETAL: 2+ peripheral pulses; no clubbing, no cyanosis, no edema  NEUROLOGY: non-focal  SKIN: No rashes or lesions; warm and dry

## 2023-07-15 NOTE — DIETITIAN NUTRITION RISK NOTIFICATION - TREATMENT: THE FOLLOWING DIET HAS BEEN RECOMMENDED
Diet, Regular:   Low Sodium  Tube Feed Start Time: 18:00  Supplement Feeding Modality:  Oral  Ensure Plus High Protein Cans or Servings Per Day:  1       Frequency:  Three Times a day (07-15-23 @ 21:36) [Pending Verification By Attending]  Diet, DASH/TLC:   Sodium & Cholesterol Restricted (07-12-23 @ 16:44) [Active]

## 2023-07-15 NOTE — PROGRESS NOTE ADULT - SUBJECTIVE AND OBJECTIVE BOX
MANNY LOUIS 89y Female  MRN#: [id number]   Hospital Day: 4d    SUBJECTIVE  Patient is a 89y old Female who presents with a chief complaint of falls, urinary tract infection (15 Jul 2023 23:47)  Currently admitted to medicine with the primary diagnosis of Acute UTI      INTERVAL HPI AND OVERNIGHT EVENTS:  Patient was examined and seen at bedside. This morning she is resting comfortably in bed and reports no issues or overnight events.      OBJECTIVE  PAST MEDICAL & SURGICAL HISTORY  Hypertension    Hypothyroidism    Hyperlipidemia    Glaucoma      ALLERGIES:  No Known Allergies    MEDICATIONS:  STANDING MEDICATIONS  amLODIPine   Tablet 5 milliGRAM(s) Oral daily  atorvastatin 40 milliGRAM(s) Oral at bedtime  cefTRIAXone   IVPB 1000 milliGRAM(s) IV Intermittent every 24 hours  cholecalciferol 2000 Unit(s) Oral daily  enoxaparin Injectable 40 milliGRAM(s) SubCutaneous every 24 hours  latanoprost 0.005% Ophthalmic Solution 1 Drop(s) Both EYES at bedtime  levothyroxine 125 MICROGram(s) Oral daily  losartan 50 milliGRAM(s) Oral daily  melatonin 3 milliGRAM(s) Oral at bedtime  multivitamin/minerals 1 Tablet(s) Oral daily  spironolactone 25 milliGRAM(s) Oral <User Schedule>  timolol 0.25% Solution 1 Drop(s) Both EYES daily    PRN MEDICATIONS      VITAL SIGNS: Last 24 Hours  T(C): 36 (16 Jul 2023 00:02), Max: 36.7 (15 Jul 2023 13:21)  T(F): 96.8 (16 Jul 2023 00:02), Max: 98.1 (15 Jul 2023 13:21)  HR: 84 (15 Jul 2023 21:13) (50 - 84)  BP: 122/59 (15 Jul 2023 21:13) (110/52 - 122/59)  BP(mean): --  RR: 16 (15 Jul 2023 21:13) (16 - 18)  SpO2: 94% (15 Jul 2023 07:48) (94% - 94%)    LABS:                        13.7   6.74  )-----------( 138      ( 15 Jul 2023 09:18 )             41.7     07-15    140  |  103  |  33<H>  ----------------------------<  80  4.1   |  25  |  1.3    Ca    8.7      15 Jul 2023 09:18  Mg     1.8     07-14    TPro  5.6<L>  /  Alb  3.4<L>  /  TBili  0.4  /  DBili  x   /  AST  26  /  ALT  18  /  AlkPhos  81  07-15      Urinalysis Basic - ( 15 Jul 2023 09:18 )    Color: x / Appearance: x / SG: x / pH: x  Gluc: 80 mg/dL / Ketone: x  / Bili: x / Urobili: x   Blood: x / Protein: x / Nitrite: x   Leuk Esterase: x / RBC: x / WBC x   Sq Epi: x / Non Sq Epi: x / Bacteria: x                RADIOLOGY:      PHYSICAL EXAM:  CONSTITUTIONAL: No acute distress, well-developed, well-groomed, AAOx3  HEAD: Atraumatic, normocephalic  EYES: EOM intact, PERRLA, conjunctiva and sclera clear  ENT: Supple, no masses, no thyromegaly, no bruits, no JVD; moist mucous membranes  PULMONARY: Clear to auscultation bilaterally; no wheezes, rales, or rhonchi  CARDIOVASCULAR: Regular rate and rhythm; no murmurs, rubs, or gallops  GASTROINTESTINAL: Soft, non-tender, non-distended; bowel sounds present  MUSCULOSKELETAL: 2+ peripheral pulses; no clubbing, no cyanosis, no edema  NEUROLOGY: non-focal  SKIN: No rashes or lesions; warm and dry    ASSESSMENT & PLAN:  Patient is a 89 yo F with PMHx of HTN, hypothyroidism, glaucoma, GERD, HTN, HLD presenting to the hospital for weakness, vasovagal episodes, and cystitis.      #Unwitnessed falls 2/2 to LE weakness vs vasovagal episodes  - per son, patient falls after she goes to the bathroom   - EKG : Sinus bradycardia with 1st degree A-V block; Left axis deviation; Possible Septal infarct, age undetermined  - CT Brain: No evidence of acute intracranial pathology; Severe chronic microvascular changes and chronic lacunar infarcts within bilateral thalami  - EEG: no epileptiform changes  - Fall precautions  - pending PT eval  - CM/SW as pt will need increase in HHA hours vs NH  - B12 and FA wnl  - TSH deranged (see below)    #Acute cystitis  - Urinalysis (07.12.23 @ 11:35): Urine Appearance Slightly Turbid; Nitrite +; Leukocyte Esterase + WBC 11  - Culture - Urine (07.12.23 @ 11:35) >100,000 CFU/ml Escherichia coli  - Continue rocephin until cultures finalize  - follow up UCX susceptabilities    #HTN  #Hx of moderate to severe AS  - unstable BP, episodes of hypotension  - Cont spironolactone  - per Cardio, space out BP medications to prevent drops - Losartan in AM and Amlodipine in PM  - per Cardio, falls unlikely related to AS as it has been stable on TTE for years, last in 2019 in office showed stable moderate to severe AS  - hold cardizem; resume if HR/BP increases      #Hypothyroidism  - TSH 17.89 uIU/mL and normal FT4  - Cont synthroid 100mcg daily until FT4 reflexes    #HLD  - cont atorvastatin    #Glaucoma  - cont latanoprost and timolol      #Misc  - DVT Prophylaxis: lovenox  - GI Prophylaxis: protonix  - Diet: low sodium  - Activity: AAT  - Code Status: full    - Dispo planning

## 2023-07-15 NOTE — DIETITIAN INITIAL EVALUATION ADULT - PERTINENT MEDS FT
MEDICATIONS  (STANDING):  amLODIPine   Tablet 5 milliGRAM(s) Oral daily  atorvastatin 40 milliGRAM(s) Oral at bedtime  cholecalciferol 2000 Unit(s) Oral daily  enoxaparin Injectable 40 milliGRAM(s) SubCutaneous every 24 hours  latanoprost 0.005% Ophthalmic Solution 1 Drop(s) Both EYES at bedtime  losartan 50 milliGRAM(s) Oral daily  melatonin 3 milliGRAM(s) Oral at bedtime  multivitamin/minerals 1 Tablet(s) Oral daily  spironolactone 25 milliGRAM(s) Oral <User Schedule>  timolol 0.25% Solution 1 Drop(s) Both EYES daily    MEDICATIONS  (PRN):

## 2023-07-15 NOTE — PROGRESS NOTE ADULT - ASSESSMENT
ASSESSMENT & PLAN:  Patient is a 89 yo F with PMHx of HTN, hypothyroidism, glaucoma, GERD, HTN, HLD presenting to the hospital for weakness, vasovagal episodes, and cystitis.      #Unwitnessed falls 2/2 to LE weakness vs vasovagal episodes  - per son, patient falls after she goes to the bathroom   - EKG : Sinus bradycardia with 1st degree A-V block; Left axis deviation; Possible Septal infarct, age undetermined  - CT Brain: No evidence of acute intracranial pathology; Severe chronic microvascular changes and chronic lacunar infarcts within bilateral thalami  - EEG: no epileptiform changes  - Fall precautions  - pending PT eval  - CM/SW as pt will need increase in HHA hours vs NH  - B12 and FA wnl  - TSH deranged (see below)    #Acute cystitis  - Urinalysis (07.12.23 @ 11:35): Urine Appearance Slightly Turbid; Nitrite +; Leukocyte Esterase + WBC 11  - Culture - Urine (07.12.23 @ 11:35) >100,000 CFU/ml Escherichia coli  - Continue rocephin until cultures finalize  - follow up UCX susceptabilities    #HTN  #Hx of moderate to severe AS  - unstable BP, episodes of hypotension  - Cont spironolactone  - per Cardio, space out BP medications to prevent drops - Losartan in AM and Amlodipine in PM  - per Cardio, falls unlikely related to AS as it has been stable on TTE for years, last in 2019 in office showed stable moderate to severe AS  - hold cardizem; resume if HR/BP increases      #Hypothyroidism  - TSH 17.89 uIU/mL and normal FT4  - pending FT4  - Cont synthroid 100mcg daily until FT4 reflexes    #HLD  - cont atorvastatin    #Glaucoma  - cont latanoprost and timolol      #Misc  - DVT Prophylaxis: lovenox  - GI Prophylaxis: protonix  - Diet: DASH/TLC  - Activity: AAT  - Code Status: full    - Pending PT eval  - Dispo planning
  ASSESSMENT & PLAN:  Patient is a 89 yo F with PMHx of HTN, hypothyroidism, glaucoma, GERD, HTN, HLD presenting to the hospital for 1 week of weakness. Found to have acute cystitis.      #Unwitnessed falls 2/2 to LE weakness vs vasovagal episodes  - per son, patient falls after she goes to the bathroom   - EKG (7/12/23 @ 11:51): Sinus bradycardia with 1st degree A-V block; Left axis deviation; Possible Septal infarct, age undetermined  - CT Brain (7/12/23 @ 13:38): No evidence of acute intracranial pathology; Severe chronic microvascular changes and chronic lacunar infarcts within bilateral thalami  - EEG: no epileptiform changes  - Fall precautions  - pending PT eval  - CM/SW as pt will need increase in HHA hours vs NH  - replenish electrolytes K was 3.2 (pt on spironolactone)  - B12 and FA wnl  - TSH deranged (see prob below)    #Acute cystitis  - Urinalysis (07.12.23 @ 11:35): Urine Appearance Slightly Turbid; Nitrite +; Leukocyte Esterase + WBC 11  - Culture - Urine (07.12.23 @ 11:35) >100,000 CFU/ml Escherichia coli  - Rocephin day3, will finish today  - follow up UCX susceptabilities    #HTN  #Hx of moderate to severe AS  - unstable BP, episodes of hypotension  - Cont spironolactone  - per Cardio, space out BP medications to prevent drops - Losartan in AM and Amlodipine in PM  - per Cardio, falls unlikely related to AS as it has been stable on TTE for years, last in 2019 in office showed stable moderate to severe AS  - hold cardizem; resume if HR/BP increases      #Hypothyroidism  - TSH 17.89 uIU/mL (07.13.23 @ 07:41)  - pending FT4  - Cont synthroid 100mcg daily until FT4 reflexes    #HLD  - cont atorvastatin    #Glaucoma  - cont latanoprost and timolol      #Misc  - DVT Prophylaxis: lovenox  - GI Prophylaxis: protonix  - Diet: DASH/TLC  - Activity: AAT  - Code Status: full    - Pending PT eval  - Dispo planning

## 2023-07-15 NOTE — DIETITIAN INITIAL EVALUATION ADULT - NAME AND PHONE
Intervention: 1.Meals and Snacks 2.Medical Food Supplement 3.Vitamin Supplement 4.Coordination of Care  Monitor/Evaluate: Diet order, energy intake, nutrition focused physical findings, renal profile

## 2023-07-15 NOTE — DIETITIAN INITIAL EVALUATION ADULT - OTHER CALCULATIONS
Estimated Calorie Needs: [id number] x AF 1.3-1.2=2679-9951msee/day -Due to low BMI, PI and PCM  Estimated Protein Needs: 62-72grams/day (1.3-1.5grams/kg of admit weight) -Due to low BMI, PI, PCM and Age  Estimated Fluid Needs: 1082-1248mL/day (1mL/kcal)

## 2023-07-15 NOTE — DIETITIAN INITIAL EVALUATION ADULT - PERTINENT LABORATORY DATA
07-15    140  |  103  |  33<H>  ----------------------------<  80  4.1   |  25  |  1.3    Ca    8.7      15 Jul 2023 09:18  Mg     1.8     07-14    TPro  5.6<L>  /  Alb  3.4<L>  /  TBili  0.4  /  DBili  x   /  AST  26  /  ALT  18  /  AlkPhos  81  07-15  A1C with Estimated Average Glucose Result: 5.7 % (07-13-23 @ 07:41)

## 2023-07-15 NOTE — DIETITIAN INITIAL EVALUATION ADULT - NSFNSGIIOFT_GEN_A_CORE
Dx: 89y/o female with h/o HTN, hypothyroidism, glaucoma, GERD, HTN and HLD, presented to the hospital for unwitnessed falls secondary to LE weakness vs vasovagal episodes and acute cystitis.  Dx: 89y/o female with h/o HTN, hypothyroidism, glaucoma, GERD and HLD, presented to the hospital for unwitnessed falls secondary to LE weakness vs vasovagal episodes and acute cystitis.

## 2023-07-15 NOTE — DIETITIAN INITIAL EVALUATION ADULT - ORAL INTAKE PTA/DIET HISTORY
I spoke to the patient's family, who is present. Per family, the patient followed a regular diet at home; consumed small meals at 50%. For breakfast, the patient consumed one bowl of cereal. For lunch, the patient consumed half of a sandwich. The patient consumed half of their dinner. The patient also consumed ensure twice daily. Per family, the patient c/o swallowing difficulty; receives thickener in their water. NKFA.

## 2023-07-16 LAB
ALBUMIN SERPL ELPH-MCNC: 3.2 G/DL — LOW (ref 3.5–5.2)
ALP SERPL-CCNC: 77 U/L — SIGNIFICANT CHANGE UP (ref 30–115)
ALT FLD-CCNC: 23 U/L — SIGNIFICANT CHANGE UP (ref 0–41)
ANION GAP SERPL CALC-SCNC: 9 MMOL/L — SIGNIFICANT CHANGE UP (ref 7–14)
AST SERPL-CCNC: 45 U/L — HIGH (ref 0–41)
BASOPHILS # BLD AUTO: 0.03 K/UL — SIGNIFICANT CHANGE UP (ref 0–0.2)
BASOPHILS NFR BLD AUTO: 0.4 % — SIGNIFICANT CHANGE UP (ref 0–1)
BILIRUB SERPL-MCNC: 0.3 MG/DL — SIGNIFICANT CHANGE UP (ref 0.2–1.2)
BUN SERPL-MCNC: 35 MG/DL — HIGH (ref 10–20)
CALCIUM SERPL-MCNC: 8.6 MG/DL — SIGNIFICANT CHANGE UP (ref 8.4–10.4)
CHLORIDE SERPL-SCNC: 105 MMOL/L — SIGNIFICANT CHANGE UP (ref 98–110)
CO2 SERPL-SCNC: 26 MMOL/L — SIGNIFICANT CHANGE UP (ref 17–32)
CREAT SERPL-MCNC: 1.1 MG/DL — SIGNIFICANT CHANGE UP (ref 0.7–1.5)
EGFR: 45 ML/MIN/1.73M2 — LOW
EOSINOPHIL # BLD AUTO: 0.17 K/UL — SIGNIFICANT CHANGE UP (ref 0–0.7)
EOSINOPHIL NFR BLD AUTO: 2.4 % — SIGNIFICANT CHANGE UP (ref 0–8)
GLUCOSE SERPL-MCNC: 81 MG/DL — SIGNIFICANT CHANGE UP (ref 70–99)
HCT VFR BLD CALC: 39.7 % — SIGNIFICANT CHANGE UP (ref 37–47)
HGB BLD-MCNC: 13 G/DL — SIGNIFICANT CHANGE UP (ref 12–16)
IMM GRANULOCYTES NFR BLD AUTO: 0.6 % — HIGH (ref 0.1–0.3)
LYMPHOCYTES # BLD AUTO: 1.56 K/UL — SIGNIFICANT CHANGE UP (ref 1.2–3.4)
LYMPHOCYTES # BLD AUTO: 22.4 % — SIGNIFICANT CHANGE UP (ref 20.5–51.1)
MCHC RBC-ENTMCNC: 32.1 PG — HIGH (ref 27–31)
MCHC RBC-ENTMCNC: 32.7 G/DL — SIGNIFICANT CHANGE UP (ref 32–37)
MCV RBC AUTO: 98 FL — SIGNIFICANT CHANGE UP (ref 81–99)
MONOCYTES # BLD AUTO: 0.78 K/UL — HIGH (ref 0.1–0.6)
MONOCYTES NFR BLD AUTO: 11.2 % — HIGH (ref 1.7–9.3)
NEUTROPHILS # BLD AUTO: 4.37 K/UL — SIGNIFICANT CHANGE UP (ref 1.4–6.5)
NEUTROPHILS NFR BLD AUTO: 63 % — SIGNIFICANT CHANGE UP (ref 42.2–75.2)
NRBC # BLD: 0 /100 WBCS — SIGNIFICANT CHANGE UP (ref 0–0)
PLATELET # BLD AUTO: 160 K/UL — SIGNIFICANT CHANGE UP (ref 130–400)
PMV BLD: 12.4 FL — HIGH (ref 7.4–10.4)
POTASSIUM SERPL-MCNC: 5.8 MMOL/L — HIGH (ref 3.5–5)
POTASSIUM SERPL-SCNC: 5.8 MMOL/L — HIGH (ref 3.5–5)
PROT SERPL-MCNC: 5.7 G/DL — LOW (ref 6–8)
RBC # BLD: 4.05 M/UL — LOW (ref 4.2–5.4)
RBC # FLD: 13.9 % — SIGNIFICANT CHANGE UP (ref 11.5–14.5)
SODIUM SERPL-SCNC: 140 MMOL/L — SIGNIFICANT CHANGE UP (ref 135–146)
WBC # BLD: 6.95 K/UL — SIGNIFICANT CHANGE UP (ref 4.8–10.8)
WBC # FLD AUTO: 6.95 K/UL — SIGNIFICANT CHANGE UP (ref 4.8–10.8)

## 2023-07-16 PROCEDURE — 99232 SBSQ HOSP IP/OBS MODERATE 35: CPT

## 2023-07-16 RX ADMIN — Medication 3 MILLIGRAM(S): at 21:57

## 2023-07-16 RX ADMIN — LATANOPROST 1 DROP(S): 0.05 SOLUTION/ DROPS OPHTHALMIC; TOPICAL at 21:57

## 2023-07-16 RX ADMIN — LOSARTAN POTASSIUM 50 MILLIGRAM(S): 100 TABLET, FILM COATED ORAL at 06:04

## 2023-07-16 RX ADMIN — AMLODIPINE BESYLATE 5 MILLIGRAM(S): 2.5 TABLET ORAL at 06:03

## 2023-07-16 RX ADMIN — ATORVASTATIN CALCIUM 40 MILLIGRAM(S): 80 TABLET, FILM COATED ORAL at 21:57

## 2023-07-16 RX ADMIN — ENOXAPARIN SODIUM 40 MILLIGRAM(S): 100 INJECTION SUBCUTANEOUS at 06:05

## 2023-07-16 RX ADMIN — Medication 1 TABLET(S): at 11:57

## 2023-07-16 RX ADMIN — Medication 1 DROP(S): at 11:57

## 2023-07-16 RX ADMIN — CEFTRIAXONE 100 MILLIGRAM(S): 500 INJECTION, POWDER, FOR SOLUTION INTRAMUSCULAR; INTRAVENOUS at 10:58

## 2023-07-16 RX ADMIN — Medication 125 MICROGRAM(S): at 06:04

## 2023-07-16 RX ADMIN — Medication 2000 UNIT(S): at 11:05

## 2023-07-16 NOTE — PROGRESS NOTE ADULT - REASON FOR ADMISSION
falls, urinary tract infection

## 2023-07-16 NOTE — PROGRESS NOTE ADULT - NUTRITIONAL ASSESSMENT
This patient has been assessed with a concern for Malnutrition and has been determined to have a diagnosis/diagnoses of Severe protein-calorie malnutrition.    This patient is being managed with:   Diet DASH/TLC-  Sodium & Cholesterol Restricted  Entered: Jul 12 2023  4:44PM    The following pending diet order is being considered for treatment of Severe protein-calorie malnutrition:  Diet Regular-  Low Sodium  Tube Feed Start Time: 18:00  Supplement Feeding Modality:  Oral  Ensure Plus High Protein Cans or Servings Per Day:  1       Frequency:  Three Times a day  Entered: Jul 15 2023  9:36PM  
This patient has been assessed with a concern for Malnutrition and has been determined to have a diagnosis/diagnoses of Severe protein-calorie malnutrition.    This patient is being managed with:   Diet DASH/TLC-  Sodium & Cholesterol Restricted  Entered: Jul 12 2023  4:44PM    The following pending diet order is being considered for treatment of Severe protein-calorie malnutrition:  Diet Regular-  Low Sodium  Tube Feed Start Time: 18:00  Supplement Feeding Modality:  Oral  Ensure Plus High Protein Cans or Servings Per Day:  1       Frequency:  Three Times a day  Entered: Jul 15 2023  9:36PM

## 2023-07-16 NOTE — PROGRESS NOTE ADULT - SUBJECTIVE AND OBJECTIVE BOX
MANNY LOUIS 89y Female  MRN#: [id number]       SUBJECTIVE  Patient is a 89y old Female who presents with a chief complaint of falls, urinary tract infection (15 Jul 2023 23:47)  Currently admitted to medicine with the primary diagnosis of Acute UTI      INTERVAL HPI AND OVERNIGHT EVENTS:  Patient was examined and seen at bedside. She looks really well today clinically. Son bedside and we discussed [facility] and CM updated about his choices      OBJECTIVE  PAST MEDICAL & SURGICAL HISTORY  Hypertension    Hypothyroidism    Hyperlipidemia    Glaucoma      ALLERGIES:  No Known Allergies    MEDICATIONS:  STANDING MEDICATIONS  amLODIPine   Tablet 5 milliGRAM(s) Oral daily  atorvastatin 40 milliGRAM(s) Oral at bedtime  cefTRIAXone   IVPB 1000 milliGRAM(s) IV Intermittent every 24 hours  cholecalciferol 2000 Unit(s) Oral daily  enoxaparin Injectable 40 milliGRAM(s) SubCutaneous every 24 hours  latanoprost 0.005% Ophthalmic Solution 1 Drop(s) Both EYES at bedtime  levothyroxine 125 MICROGram(s) Oral daily  losartan 50 milliGRAM(s) Oral daily  melatonin 3 milliGRAM(s) Oral at bedtime  multivitamin/minerals 1 Tablet(s) Oral daily  spironolactone 25 milliGRAM(s) Oral <User Schedule>  timolol 0.25% Solution 1 Drop(s) Both EYES daily    PRN MEDICATIONS      VITAL SIGNS: Last 24 Hours  T(C): 36 (16 Jul 2023 00:02), Max: 36.7 (15 Jul 2023 13:21)  T(F): 96.8 (16 Jul 2023 00:02), Max: 98.1 (15 Jul 2023 13:21)  HR: 84 (15 Jul 2023 21:13) (50 - 84)  BP: 122/59 (15 Jul 2023 21:13) (110/52 - 122/59)  BP(mean): --  RR: 16 (15 Jul 2023 21:13) (16 - 18)  SpO2: 94% (15 Jul 2023 07:48) (94% - 94%)    LABS:                        13.7   6.74  )-----------( 138      ( 15 Jul 2023 09:18 )             41.7     07-15    140  |  103  |  33<H>  ----------------------------<  80  4.1   |  25  |  1.3    Ca    8.7      15 Jul 2023 09:18  Mg     1.8     07-14    TPro  5.6<L>  /  Alb  3.4<L>  /  TBili  0.4  /  DBili  x   /  AST  26  /  ALT  18  /  AlkPhos  81  07-15      Urinalysis Basic - ( 15 Jul 2023 09:18 )    Color: x / Appearance: x / SG: x / pH: x  Gluc: 80 mg/dL / Ketone: x  / Bili: x / Urobili: x   Blood: x / Protein: x / Nitrite: x   Leuk Esterase: x / RBC: x / WBC x   Sq Epi: x / Non Sq Epi: x / Bacteria: x                RADIOLOGY:      PHYSICAL EXAM:  CONSTITUTIONAL: No acute distress, well-developed, well-groomed, AAOx3  HEAD: Atraumatic, normocephalic  EYES: EOM intact, PERRLA, conjunctiva and sclera clear  ENT: Supple, no masses, no thyromegaly, no bruits, no JVD; moist mucous membranes  PULMONARY: Clear to auscultation bilaterally; no wheezes, rales, or rhonchi  CARDIOVASCULAR: Regular rate and rhythm; no murmurs, rubs, or gallops  GASTROINTESTINAL: Soft, non-tender, non-distended; bowel sounds present  MUSCULOSKELETAL: 2+ peripheral pulses; no clubbing, no cyanosis, no edema  NEUROLOGY: non-focal  SKIN: No rashes or lesions; warm and dry    ASSESSMENT & PLAN:  Patient is a 89 yo F with PMHx of HTN, hypothyroidism, glaucoma, GERD, HTN, HLD presenting to the hospital for weakness, vasovagal episodes, and cystitis.      #Unwitnessed falls 2/2 to LE weakness vs vasovagal episodes  - per son, patient falls after she goes to the bathroom   - EKG : Sinus bradycardia with 1st degree A-V block; Left axis deviation; Possible Septal infarct, age undetermined  - CT Brain: No evidence of acute intracranial pathology; Severe chronic microvascular changes and chronic lacunar infarcts within bilateral thalami  - EEG: no epileptiform changes  - Fall precautions  - pending PT eval  - CM/SW as pt will need increase in HHA hours vs NH  - B12 and FA wnl  - TSH deranged (see below)  - K is hemolyzed on 7/16; repeat bmp on 7/17    #Acute cystitis  - Urinalysis (07.12.23 @ 11:35): Urine Appearance Slightly Turbid; Nitrite +; Leukocyte Esterase + WBC 11  - Culture - Urine (07.12.23 @ 11:35) >100,000 CFU/ml Escherichia coli sensitive to ceftriaxone  - Continue rocephin until 7/17      #HTN  #Hx of moderate to severe AS  - unstable BP, episodes of hypotension  - Cont spironolactone  - per Cardio, space out BP medications to prevent drops - Losartan in AM and Amlodipine in PM  - per Cardio, falls unlikely related to AS as it has been stable on TTE for years, last in 2019 in office showed stable moderate to severe AS  - hold cardizem; resume if HR/BP increases      #subclinical Hypothyroidism  - TSH 17.89 uIU/mL and normal FT4  - given TSH is >10, will increase levothyroxine from 100mcg to 125mcg qd    #HLD  - cont atorvastatin    #Glaucoma  - cont latanoprost and timolol      #Misc  - DVT Prophylaxis: lovenox  - GI Prophylaxis: protonix  - Diet: low sodium  - Activity: AAT  - Code Status: full    - Dispo planning

## 2023-07-17 ENCOUNTER — TRANSCRIPTION ENCOUNTER (OUTPATIENT)
Age: 88
End: 2023-07-17

## 2023-07-17 VITALS
SYSTOLIC BLOOD PRESSURE: 124 MMHG | OXYGEN SATURATION: 96 % | TEMPERATURE: 99 F | HEART RATE: 64 BPM | DIASTOLIC BLOOD PRESSURE: 64 MMHG

## 2023-07-17 LAB
ALBUMIN SERPL ELPH-MCNC: 3.4 G/DL — LOW (ref 3.5–5.2)
ALP SERPL-CCNC: 89 U/L — SIGNIFICANT CHANGE UP (ref 30–115)
ALT FLD-CCNC: 23 U/L — SIGNIFICANT CHANGE UP (ref 0–41)
ANION GAP SERPL CALC-SCNC: 6 MMOL/L — LOW (ref 7–14)
AST SERPL-CCNC: 29 U/L — SIGNIFICANT CHANGE UP (ref 0–41)
BASOPHILS # BLD AUTO: 0.03 K/UL — SIGNIFICANT CHANGE UP (ref 0–0.2)
BASOPHILS NFR BLD AUTO: 0.4 % — SIGNIFICANT CHANGE UP (ref 0–1)
BILIRUB SERPL-MCNC: 0.3 MG/DL — SIGNIFICANT CHANGE UP (ref 0.2–1.2)
BUN SERPL-MCNC: 32 MG/DL — HIGH (ref 10–20)
CALCIUM SERPL-MCNC: 9 MG/DL — SIGNIFICANT CHANGE UP (ref 8.4–10.5)
CHLORIDE SERPL-SCNC: 105 MMOL/L — SIGNIFICANT CHANGE UP (ref 98–110)
CO2 SERPL-SCNC: 31 MMOL/L — SIGNIFICANT CHANGE UP (ref 17–32)
CREAT SERPL-MCNC: 0.9 MG/DL — SIGNIFICANT CHANGE UP (ref 0.7–1.5)
EGFR: 57 ML/MIN/1.73M2 — LOW
EOSINOPHIL # BLD AUTO: 0.18 K/UL — SIGNIFICANT CHANGE UP (ref 0–0.7)
EOSINOPHIL NFR BLD AUTO: 2.4 % — SIGNIFICANT CHANGE UP (ref 0–8)
GLUCOSE SERPL-MCNC: 92 MG/DL — SIGNIFICANT CHANGE UP (ref 70–99)
HCT VFR BLD CALC: 40.7 % — SIGNIFICANT CHANGE UP (ref 37–47)
HGB BLD-MCNC: 13.6 G/DL — SIGNIFICANT CHANGE UP (ref 12–16)
IMM GRANULOCYTES NFR BLD AUTO: 0.4 % — HIGH (ref 0.1–0.3)
LYMPHOCYTES # BLD AUTO: 1.64 K/UL — SIGNIFICANT CHANGE UP (ref 1.2–3.4)
LYMPHOCYTES # BLD AUTO: 22.1 % — SIGNIFICANT CHANGE UP (ref 20.5–51.1)
MCHC RBC-ENTMCNC: 33.2 PG — HIGH (ref 27–31)
MCHC RBC-ENTMCNC: 33.4 G/DL — SIGNIFICANT CHANGE UP (ref 32–37)
MCV RBC AUTO: 99.3 FL — HIGH (ref 81–99)
MONOCYTES # BLD AUTO: 0.75 K/UL — HIGH (ref 0.1–0.6)
MONOCYTES NFR BLD AUTO: 10.1 % — HIGH (ref 1.7–9.3)
NEUTROPHILS # BLD AUTO: 4.78 K/UL — SIGNIFICANT CHANGE UP (ref 1.4–6.5)
NEUTROPHILS NFR BLD AUTO: 64.6 % — SIGNIFICANT CHANGE UP (ref 42.2–75.2)
NRBC # BLD: 0 /100 WBCS — SIGNIFICANT CHANGE UP (ref 0–0)
PLATELET # BLD AUTO: 172 K/UL — SIGNIFICANT CHANGE UP (ref 130–400)
PMV BLD: 11.2 FL — HIGH (ref 7.4–10.4)
POTASSIUM SERPL-MCNC: 4 MMOL/L — SIGNIFICANT CHANGE UP (ref 3.5–5)
POTASSIUM SERPL-SCNC: 4 MMOL/L — SIGNIFICANT CHANGE UP (ref 3.5–5)
PROT SERPL-MCNC: 5.7 G/DL — LOW (ref 6–8)
RBC # BLD: 4.1 M/UL — LOW (ref 4.2–5.4)
RBC # FLD: 13.8 % — SIGNIFICANT CHANGE UP (ref 11.5–14.5)
SODIUM SERPL-SCNC: 142 MMOL/L — SIGNIFICANT CHANGE UP (ref 135–146)
WBC # BLD: 7.41 K/UL — SIGNIFICANT CHANGE UP (ref 4.8–10.8)
WBC # FLD AUTO: 7.41 K/UL — SIGNIFICANT CHANGE UP (ref 4.8–10.8)

## 2023-07-17 PROCEDURE — 99239 HOSP IP/OBS DSCHRG MGMT >30: CPT

## 2023-07-17 RX ORDER — LANOLIN ALCOHOL/MO/W.PET/CERES
1 CREAM (GRAM) TOPICAL
Refills: 0 | DISCHARGE

## 2023-07-17 RX ORDER — AMLODIPINE BESYLATE 2.5 MG/1
1 TABLET ORAL
Qty: 0 | Refills: 0 | DISCHARGE

## 2023-07-17 RX ORDER — DILTIAZEM HCL 120 MG
1 CAPSULE, EXT RELEASE 24 HR ORAL
Refills: 0 | DISCHARGE

## 2023-07-17 RX ORDER — SPIRONOLACTONE 25 MG/1
1 TABLET, FILM COATED ORAL
Qty: 0 | Refills: 0 | DISCHARGE
Start: 2023-07-17

## 2023-07-17 RX ORDER — LANOLIN ALCOHOL/MO/W.PET/CERES
1 CREAM (GRAM) TOPICAL
Qty: 0 | Refills: 0 | DISCHARGE
Start: 2023-07-17

## 2023-07-17 RX ORDER — LOSARTAN POTASSIUM 100 MG/1
1 TABLET, FILM COATED ORAL
Refills: 0 | DISCHARGE

## 2023-07-17 RX ORDER — LOSARTAN POTASSIUM 100 MG/1
1 TABLET, FILM COATED ORAL
Qty: 0 | Refills: 0 | DISCHARGE
Start: 2023-07-17

## 2023-07-17 RX ORDER — SPIRONOLACTONE 25 MG/1
1 TABLET, FILM COATED ORAL
Refills: 0 | DISCHARGE

## 2023-07-17 RX ADMIN — SPIRONOLACTONE 25 MILLIGRAM(S): 25 TABLET, FILM COATED ORAL at 05:27

## 2023-07-17 RX ADMIN — LOSARTAN POTASSIUM 50 MILLIGRAM(S): 100 TABLET, FILM COATED ORAL at 05:27

## 2023-07-17 RX ADMIN — Medication 2000 UNIT(S): at 12:02

## 2023-07-17 RX ADMIN — Medication 1 TABLET(S): at 12:02

## 2023-07-17 RX ADMIN — AMLODIPINE BESYLATE 5 MILLIGRAM(S): 2.5 TABLET ORAL at 05:27

## 2023-07-17 RX ADMIN — Medication 1 DROP(S): at 12:03

## 2023-07-17 RX ADMIN — Medication 125 MICROGRAM(S): at 05:27

## 2023-07-17 RX ADMIN — ENOXAPARIN SODIUM 40 MILLIGRAM(S): 100 INJECTION SUBCUTANEOUS at 05:27

## 2023-07-17 NOTE — DISCHARGE NOTE PROVIDER - HOSPITAL COURSE
89yo F hx of HTN, hypothyroidism, glaucoma, GERD, HTN, HLD presenting to the hospital for 1 week of weakness. Her legs would "feel weak" leading to multiple unwitnessed falls (3x) in the bathroom, difficulty walking. Denies pain or HT. Because of the falls, her cardiologist Dr. Fox said to stop her HTN meds (diltiazem, amlodipine). Losartan was continued. Per son, has been having foul-smelling urine. In the ED, Labs significant for macrocytic anemia, hypernatremia later resolved, elevated BUN, UA (+) for nitrites, LE and bacteria. CT-head negative for acute pathology. Chest Xray negative for acute cardiopulmonary disease. Patient was admitted for acute cystitis and weakness.    During hospitalization, urine culture found E coli. Patient was treated with ceftriaxone. Urinary symptoms resolved. EKG showed sinus bradycardia with 1st degree A-V block, left axis deviation, possible Septal infarct. EEG findings consistent with focal and diffuse electrocerebral dysfunction secondary to structural/metabolic/nonspecific etiology. She was also diagnosed with vasovagal syncope, hypothyroidism treated with levothyroxine. To note, patient has a history of aortic stenosis. As per Cardiology consult, falls unlikely related to AS as it has been stable on TTE for years, last in 2019 in office showed stable moderate to severe AS. 89yo F hx of HTN, hypothyroidism, glaucoma, GERD, HTN, HLD presenting to the hospital for 1 week of weakness. Found to have a UTI for which she was treated with ceftriaxone which UCx showed ecoli that was susceptible to. Her falls were vasovagal in nature and spoke to family about placement. She does have AS and her BP meds were adjusted to space out her BP medications which helped immensely. She was seen by cardiology who felt her syncope is also not secondary to AS and no indication to repeat a TTE. Infectious workup and scans have been negative. Pt improved clinically with ceftriaxone alone.     In the ED, Labs significant for macrocytic anemia, hypernatremia later resolved, elevated BUN, UA (+) for nitrites, LE and bacteria. CT-head negative for acute pathology. Chest Xray negative for acute cardiopulmonary disease. Patient was admitted for acute cystitis and weakness. Ucx: ecoli. Patient was treated with ceftriaxone. EKG showed sinus bradycardia with 1st degree A-V block, left axis deviation, possible Septal infarct. EEG findings consistent with focal and diffuse electrocerebral dysfunction secondary to structural/metabolic/nonspecific etiology.     Continue spironolactone 25mg and losartan 50mg in the am, decrease amlodipine to 5mg and give in the pm. Holding home diltiazem given HR has been in the 60s off of it.

## 2023-07-17 NOTE — DISCHARGE NOTE PROVIDER - NSDCCPCAREPLAN_GEN_ALL_CORE_FT
PRINCIPAL DISCHARGE DIAGNOSIS  Diagnosis: Acute UTI  Assessment and Plan of Treatment: You were noted either on arrival or during this hospitalization to have a Urinary Tract Infection. You may have already been treated and completed the antibiotics, please refer to the list of medications present on your discharge paperwork. If you notice that there are antibiotics listed, these may be to treat your infection, be sure to complete taking the full course, whether you have symptoms or not, as prescribed.  While taking antibiotics, you may benefit from taking a probiotic such as florastore to help to try and prevent an infectious type of diarrhea known as C Diff. If you notice that you begin having severe watery diarrhea, more than 4-5 episodes a day, please see your Primary Care Doctor or come to the ER to have your stool tested for this infection.   It is not necessary to repeat a urine test to see if the infection is gone, it is assumed that after treatment it should have resolved. However, if you continue to have symptoms, please see your Primary Care doctor or return to the ER.        SECONDARY DISCHARGE DIAGNOSES  Diagnosis: Vasovagal syncope  Assessment and Plan of Treatment: Syncope  Syncope is when you temporarily lose consciousness, also called fainting or passing out. It is caused by a sudden decrease in blood flow to the brain. Even though most causes of syncope are not dangerous, syncope can possibly be a sign of a serious medical problem. Signs that you may be about to faint include feeling dizzy, lightheaded, nausea, visual changes, or cold/clammy skin. Do not drive, operate heavy machinery, or play sports until your health care provider says it is okay.  SEEK IMMEDIATE MEDICAL CARE IF YOU HAVE ANY OF THE FOLLOWING SYMPTOMS: severe headache, pain in your chest/abdomen/back, bleeding from your mouth or rectum, palpitations, shortness of breath, pain with breathing, seizure, confusion, or trouble walking.

## 2023-07-17 NOTE — DISCHARGE NOTE PROVIDER - NSDCFUSCHEDAPPT_GEN_ALL_CORE_FT
Thuy Fox  [facility]  CARDIOLOGY [address] [place] [address]  Scheduled Appointment: 08/15/2023

## 2023-07-17 NOTE — DISCHARGE NOTE PROVIDER - NSDCMRMEDTOKEN_GEN_ALL_CORE_FT
amLODIPine 5 mg oral tablet: 1 tab(s) orally once a day  atorvastatin 40 mg oral tablet: 1 tab(s) orally once a day  DilTIAZem (Eqv-Cardizem CD) 120 mg/24 hours oral capsule, extended release: 1 cap(s) orally once a day  famotidine 20 mg oral tablet: 1 tab(s) orally once a day  losartan 100 mg oral tablet: 1 tab(s) orally once a day  melatonin 5 mg oral tablet: 1 tab(s) orally once a day (at bedtime)  PreserVision AREDS 2 oral capsule: 1 cap(s) orally 2 times a day  spironolactone 25 mg oral tablet: 1 tab(s) orally 2 times a week Monday and Friday  Synthroid 100 mcg (0.1 mg) oral tablet: 1 tab(s) orally once a day  timolol maleate 0.25% preservative-free ophthalmic solution: 1 drop(s) in each affected eye once a day  Vitamin B12 Methylcobalamin 2000 mcg oral capsule: 1 cap(s) orally 2 times a week Monday and Friday  Vitamin C 250 mg oral tablet: 1 tab(s) orally once a day  Vitamin D3 50 mcg (2000 intl units) oral tablet: 1 tab(s) orally once a day  vitamin E 100 intl units oral tablet: 1 tab(s) orally 5 times a week every day except monday and friday  Xalatan 0.005% ophthalmic solution: 1 gram(s) in each eye once a day   Aldactone 25 mg oral tablet: 1 tab(s) orally once a day  amLODIPine 5 mg oral tablet: 1 tab(s) orally once a day (at bedtime)  atorvastatin 40 mg oral tablet: 1 tab(s) orally once a day  famotidine 20 mg oral tablet: 1 tab(s) orally once a day  losartan 50 mg oral tablet: 1 tab(s) orally once a day  melatonin 3 mg oral tablet: 1 tab(s) orally once a day (at bedtime)  PreserVision AREDS 2 oral capsule: 1 cap(s) orally 2 times a day  Synthroid 100 mcg (0.1 mg) oral tablet: 1 tab(s) orally once a day  timolol maleate 0.25% preservative-free ophthalmic solution: 1 drop(s) in each affected eye once a day  Vitamin B12 Methylcobalamin 2000 mcg oral capsule: 1 cap(s) orally once a day  Vitamin C 250 mg oral tablet: 1 tab(s) orally once a day  Vitamin D3 50 mcg (2000 intl units) oral tablet: 1 tab(s) orally once a day  vitamin E 100 intl units oral tablet: 1 tab(s) orally 5 times a week every day except monday and friday  Xalatan 0.005% ophthalmic solution: 1 gram(s) in each eye once a day

## 2023-07-17 NOTE — DISCHARGE NOTE PROVIDER - PROVIDER TOKENS
PROVIDER:[TOKEN:[[id number]],FOLLOWUP:[2 weeks]],PROVIDER:[TOKEN:[[id number]],FOLLOWUP:[2 weeks]]

## 2023-07-17 NOTE — DISCHARGE NOTE NURSING/CASE MANAGEMENT/SOCIAL WORK - PATIENT PORTAL LINK FT
You can access the FollowMyHealth Patient Portal offered by [facility] by registering at the following website: [email or web address]. By joining [facility]’s FollowMyHealth portal, you will also be able to view your health information using other applications (apps) compatible with our system.

## 2023-07-17 NOTE — DISCHARGE NOTE PROVIDER - CARE PROVIDER_API CALL
Darien Landers  Gastroenterology  [address] [place] [address]  Phone: [phone]  Fax: [phone]  Follow Up Time: 2 weeks    Thuy Fox  Cardiovascular Disease  [address] [place] [id number]  Phone: [phone]  Fax: [phone]  Follow Up Time: 2 weeks

## 2023-07-17 NOTE — DISCHARGE NOTE PROVIDER - ATTENDING DISCHARGE PHYSICAL EXAMINATION:
CONSTITUTIONAL: No acute distress, well-developed, well-groomed, AAOx3  HEAD: Atraumatic, normocephalic  EYES: EOM intact, PERRLA, conjunctiva and sclera clear  ENT: Supple, no masses, no thyromegaly, no bruits, no JVD; moist mucous membranes  PULMONARY: Clear to auscultation bilaterally; no wheezes, rales, or rhonchi  CARDIOVASCULAR: Regular rate and rhythm; no murmurs, rubs, or gallops  GASTROINTESTINAL: Soft, non-tender, non-distended; bowel sounds present  MUSCULOSKELETAL: 2+ peripheral pulses; no clubbing, no cyanosis, no edema  NEUROLOGY: non-focal  SKIN: dry skin

## 2023-07-17 NOTE — DISCHARGE NOTE NURSING/CASE MANAGEMENT/SOCIAL WORK - NSDCPEFALRISK_GEN_ALL_CORE
For information on Fall & Injury Prevention, visit: [email or web address] OR  [email or web address] OR  https://www.cdc.gov/steadi/patient.html

## 2023-07-27 DIAGNOSIS — E87.0 HYPEROSMOLALITY AND HYPERNATREMIA: ICD-10-CM

## 2023-07-27 DIAGNOSIS — B96.20 UNSPECIFIED ESCHERICHIA COLI [E. COLI] AS THE CAUSE OF DISEASES CLASSIFIED ELSEWHERE: ICD-10-CM

## 2023-07-27 DIAGNOSIS — E86.0 DEHYDRATION: ICD-10-CM

## 2023-07-27 DIAGNOSIS — N18.30 CHRONIC KIDNEY DISEASE, STAGE 3 UNSPECIFIED: ICD-10-CM

## 2023-07-27 DIAGNOSIS — I35.0 NONRHEUMATIC AORTIC (VALVE) STENOSIS: ICD-10-CM

## 2023-07-27 DIAGNOSIS — E03.8 OTHER SPECIFIED HYPOTHYROIDISM: ICD-10-CM

## 2023-07-27 DIAGNOSIS — E43 UNSPECIFIED SEVERE PROTEIN-CALORIE MALNUTRITION: ICD-10-CM

## 2023-07-27 DIAGNOSIS — H40.9 UNSPECIFIED GLAUCOMA: ICD-10-CM

## 2023-07-27 DIAGNOSIS — N30.00 ACUTE CYSTITIS WITHOUT HEMATURIA: ICD-10-CM

## 2023-07-27 DIAGNOSIS — D53.9 NUTRITIONAL ANEMIA, UNSPECIFIED: ICD-10-CM

## 2023-07-27 DIAGNOSIS — K21.9 GASTRO-ESOPHAGEAL REFLUX DISEASE WITHOUT ESOPHAGITIS: ICD-10-CM

## 2023-07-27 DIAGNOSIS — E78.5 HYPERLIPIDEMIA, UNSPECIFIED: ICD-10-CM

## 2023-07-27 DIAGNOSIS — R55 SYNCOPE AND COLLAPSE: ICD-10-CM

## 2023-07-27 DIAGNOSIS — I95.9 HYPOTENSION, UNSPECIFIED: ICD-10-CM

## 2023-07-27 DIAGNOSIS — I12.9 HYPERTENSIVE CHRONIC KIDNEY DISEASE WITH STAGE 1 THROUGH STAGE 4 CHRONIC KIDNEY DISEASE, OR UNSPECIFIED CHRONIC KIDNEY DISEASE: ICD-10-CM

## 2023-08-15 ENCOUNTER — APPOINTMENT (OUTPATIENT)
Dept: CARDIOLOGY | Facility: CLINIC | Age: 88
End: 2023-08-15

## 2023-08-17 NOTE — CONSULT NOTE ADULT - CONSULT REQUESTED BY NAME
Chief Complaint   Patient presents with   • Follow-up     6 mo   Home BP readings have been running high. Machine was check at last nurse visit and was accurate. Usually higher readings in the morning. Takes BP med generally before bed.   Joint pain in R hip/knee causing limping often.       Allergies: Reviewed  Medications Reviewed     Patient would like communication of their results via:        Cell Phone:   Telephone Information:   Mobile [phone]     Okay to leave a message containing results? Yes  Health Maintenance Due   Topic Date Due   • Colorectal Cancer Risk - Colonoscopy  12/19/2022   • Abdominal Aortic Aneurysm (AAA) Screening  Never done   • Pneumococcal Vaccine 65+ (4 - PPSV23 or PCV20) 02/19/2023     Patient is due for the topics as listed above and wishes to proceed with them.  Appt scheduled to perform Colorectal Cancer Screening: Colonoscopy. Orders placed for Immunization(s) Pneumococcal. Discuss AAA screening with provider.    medicine
